# Patient Record
Sex: FEMALE | Race: WHITE | NOT HISPANIC OR LATINO | Employment: FULL TIME | URBAN - METROPOLITAN AREA
[De-identification: names, ages, dates, MRNs, and addresses within clinical notes are randomized per-mention and may not be internally consistent; named-entity substitution may affect disease eponyms.]

---

## 2017-05-10 ENCOUNTER — HOSPITAL ENCOUNTER (OUTPATIENT)
Dept: RADIOLOGY | Facility: MEDICAL CENTER | Age: 32
End: 2017-05-10
Attending: FAMILY MEDICINE
Payer: COMMERCIAL

## 2017-05-10 ENCOUNTER — HOSPITAL ENCOUNTER (OUTPATIENT)
Dept: LAB | Facility: MEDICAL CENTER | Age: 32
End: 2017-05-10
Attending: FAMILY MEDICINE
Payer: COMMERCIAL

## 2017-05-10 DIAGNOSIS — M25.562 LEFT KNEE PAIN, UNSPECIFIED CHRONICITY: ICD-10-CM

## 2017-05-10 LAB
ALBUMIN SERPL BCP-MCNC: 4.7 G/DL (ref 3.2–4.9)
ALBUMIN/GLOB SERPL: 1.6 G/DL
ALP SERPL-CCNC: 66 U/L (ref 30–99)
ALT SERPL-CCNC: 26 U/L (ref 2–50)
ANION GAP SERPL CALC-SCNC: 10 MMOL/L (ref 0–11.9)
AST SERPL-CCNC: 22 U/L (ref 12–45)
BASOPHILS # BLD AUTO: 0.4 % (ref 0–1.8)
BASOPHILS # BLD: 0.04 K/UL (ref 0–0.12)
BILIRUB SERPL-MCNC: 0.5 MG/DL (ref 0.1–1.5)
BUN SERPL-MCNC: 7 MG/DL (ref 8–22)
CALCIUM SERPL-MCNC: 10.5 MG/DL (ref 8.5–10.5)
CHLORIDE SERPL-SCNC: 107 MMOL/L (ref 96–112)
CO2 SERPL-SCNC: 22 MMOL/L (ref 20–33)
CREAT SERPL-MCNC: 0.82 MG/DL (ref 0.5–1.4)
EOSINOPHIL # BLD AUTO: 0.1 K/UL (ref 0–0.51)
EOSINOPHIL NFR BLD: 1.1 % (ref 0–6.9)
ERYTHROCYTE [DISTWIDTH] IN BLOOD BY AUTOMATED COUNT: 42.6 FL (ref 35.9–50)
GFR SERPL CREATININE-BSD FRML MDRD: >60 ML/MIN/1.73 M 2
GLOBULIN SER CALC-MCNC: 3 G/DL (ref 1.9–3.5)
GLUCOSE SERPL-MCNC: 92 MG/DL (ref 65–99)
HCT VFR BLD AUTO: 51 % (ref 37–47)
HGB BLD-MCNC: 16.9 G/DL (ref 12–16)
IMM GRANULOCYTES # BLD AUTO: 0.04 K/UL (ref 0–0.11)
IMM GRANULOCYTES NFR BLD AUTO: 0.4 % (ref 0–0.9)
LYMPHOCYTES # BLD AUTO: 2.23 K/UL (ref 1–4.8)
LYMPHOCYTES NFR BLD: 24.8 % (ref 22–41)
MCH RBC QN AUTO: 31.1 PG (ref 27–33)
MCHC RBC AUTO-ENTMCNC: 33.1 G/DL (ref 33.6–35)
MCV RBC AUTO: 93.8 FL (ref 81.4–97.8)
MONOCYTES # BLD AUTO: 0.68 K/UL (ref 0–0.85)
MONOCYTES NFR BLD AUTO: 7.5 % (ref 0–13.4)
NEUTROPHILS # BLD AUTO: 5.92 K/UL (ref 2–7.15)
NEUTROPHILS NFR BLD: 65.8 % (ref 44–72)
NRBC # BLD AUTO: 0 K/UL
NRBC BLD AUTO-RTO: 0 /100 WBC
PLATELET # BLD AUTO: 369 K/UL (ref 164–446)
PMV BLD AUTO: 10.3 FL (ref 9–12.9)
POTASSIUM SERPL-SCNC: 4.2 MMOL/L (ref 3.6–5.5)
PROT SERPL-MCNC: 7.7 G/DL (ref 6–8.2)
RBC # BLD AUTO: 5.44 M/UL (ref 4.2–5.4)
SODIUM SERPL-SCNC: 139 MMOL/L (ref 135–145)
T4 FREE SERPL-MCNC: 0.81 NG/DL (ref 0.53–1.43)
TSH SERPL DL<=0.005 MIU/L-ACNC: 1.71 UIU/ML (ref 0.3–3.7)
WBC # BLD AUTO: 9 K/UL (ref 4.8–10.8)

## 2017-05-10 PROCEDURE — 80053 COMPREHEN METABOLIC PANEL: CPT

## 2017-05-10 PROCEDURE — 36415 COLL VENOUS BLD VENIPUNCTURE: CPT

## 2017-05-10 PROCEDURE — 84443 ASSAY THYROID STIM HORMONE: CPT

## 2017-05-10 PROCEDURE — 85025 COMPLETE CBC W/AUTO DIFF WBC: CPT

## 2017-05-10 PROCEDURE — 73562 X-RAY EXAM OF KNEE 3: CPT | Mod: LT

## 2017-05-10 PROCEDURE — 84439 ASSAY OF FREE THYROXINE: CPT

## 2017-09-25 ENCOUNTER — HOSPITAL ENCOUNTER (OUTPATIENT)
Dept: LAB | Facility: MEDICAL CENTER | Age: 32
End: 2017-09-25
Attending: OBSTETRICS & GYNECOLOGY
Payer: COMMERCIAL

## 2017-09-25 PROCEDURE — 88175 CYTOPATH C/V AUTO FLUID REDO: CPT

## 2017-09-26 LAB — CYTOLOGY REG CYTOL: NORMAL

## 2018-05-16 ENCOUNTER — OFFICE VISIT (OUTPATIENT)
Dept: URGENT CARE | Facility: CLINIC | Age: 33
End: 2018-05-16
Payer: COMMERCIAL

## 2018-05-16 VITALS
OXYGEN SATURATION: 98 % | DIASTOLIC BLOOD PRESSURE: 84 MMHG | BODY MASS INDEX: 36.96 KG/M2 | RESPIRATION RATE: 16 BRPM | HEIGHT: 66 IN | TEMPERATURE: 98.4 F | WEIGHT: 230 LBS | SYSTOLIC BLOOD PRESSURE: 128 MMHG | HEART RATE: 73 BPM

## 2018-05-16 DIAGNOSIS — H65.192 OTHER ACUTE NONSUPPURATIVE OTITIS MEDIA OF LEFT EAR, RECURRENCE NOT SPECIFIED: ICD-10-CM

## 2018-05-16 PROCEDURE — 99214 OFFICE O/P EST MOD 30 MIN: CPT | Performed by: NURSE PRACTITIONER

## 2018-05-16 RX ORDER — AZITHROMYCIN 250 MG/1
TABLET, FILM COATED ORAL
Qty: 6 TAB | Refills: 0 | Status: SHIPPED | OUTPATIENT
Start: 2018-05-16 | End: 2020-08-27 | Stop reason: CLARIF

## 2018-05-16 ASSESSMENT — ENCOUNTER SYMPTOMS
SORE THROAT: 0
FEVER: 0

## 2018-05-16 NOTE — LETTER
May 16, 2018         Patient: Anaya Cortes   YOB: 1985   Date of Visit: 5/16/2018           To Whom it May Concern:    Anaya Cortes was seen in my clinic on 5/16/2018. Please excuse her from work 5/16/18.      Sincerely,           LINDEN Sawyer.  Electronically Signed

## 2018-05-16 NOTE — PROGRESS NOTES
"Subjective:      Anaya Cortes is a 32 y.o. female who presents with Otalgia (ear pain (L) x1 day)            Otalgia    There is pain in the left ear. This is a new problem. Episode onset: pt reports she woke in the middle of the night with severe pain in her left ear. She does admit she was recently sick with a runny nose and cough, but those symptoms have resolved. She denies fever. The problem occurs constantly. The problem has been gradually worsening. The pain is severe. Pertinent negatives include no ear discharge, hearing loss or sore throat. She has tried NSAIDs for the symptoms. The treatment provided no relief. There is no history of a chronic ear infection.       Review of Systems   Constitutional: Negative for fever.   HENT: Positive for ear pain. Negative for ear discharge, hearing loss and sore throat.    All other systems reviewed and are negative.    Past Medical History:   Diagnosis Date   • ASTHMA       Past Surgical History:   Procedure Laterality Date   • TONSILLECTOMY        Social History     Social History   • Marital status: Single     Spouse name: N/A   • Number of children: N/A   • Years of education: N/A     Occupational History   • Not on file.     Social History Main Topics   • Smoking status: Never Smoker   • Smokeless tobacco: Never Used   • Alcohol use Yes      Comment: occ   • Drug use: No   • Sexual activity: Not on file     Other Topics Concern   • Not on file     Social History Narrative   • No narrative on file          Objective:     /84   Pulse 73   Temp 36.9 °C (98.4 °F)   Resp 16   Ht 1.676 m (5' 6\")   Wt 104.3 kg (230 lb)   SpO2 98%   BMI 37.12 kg/m²      Physical Exam   Constitutional: She is oriented to person, place, and time. Vital signs are normal. She appears well-developed and well-nourished.   HENT:   Head: Normocephalic and atraumatic.   Right Ear: Tympanic membrane and external ear normal.   Left Ear: External ear normal. Tympanic membrane is " erythematous and bulging.   Eyes: EOM are normal. Pupils are equal, round, and reactive to light.   Neck: Normal range of motion.   Cardiovascular: Normal rate and regular rhythm.    Pulmonary/Chest: Effort normal.   Musculoskeletal: Normal range of motion.   Lymphadenopathy:        Head (left side): Submandibular adenopathy present.     She has no cervical adenopathy.   Neurological: She is alert and oriented to person, place, and time.   Skin: Skin is warm and dry. Capillary refill takes less than 2 seconds.   Psychiatric: She has a normal mood and affect. Her speech is normal and behavior is normal. Thought content normal.   Vitals reviewed.              Assessment/Plan:     1. Other acute nonsuppurative otitis media of left ear, recurrence not specified  - azithromycin (ZITHROMAX) 250 MG Tab; Take 2 tabs PO on the first day, then one tab PO daily for 4 days  Dispense: 6 Tab; Refill: 0    Flonase twice daily for one week  Alternate tylenol and ibuprofen PRN pain  Work note provided  Supportive care, differential diagnoses, and indications for immediate follow-up discussed with patient.    Pathogenesis of diagnosis discussed including typical length and natural progression.      Instructed to return to  or nearest emergency department if symptoms fail to improve, for any change in condition, further concerns, or new concerning symptoms.  Patient states understanding of the plan of care and discharge instructions.

## 2018-10-08 ENCOUNTER — HOSPITAL ENCOUNTER (OUTPATIENT)
Dept: LAB | Facility: MEDICAL CENTER | Age: 33
End: 2018-10-08
Attending: OBSTETRICS & GYNECOLOGY
Payer: COMMERCIAL

## 2018-10-08 PROCEDURE — 87624 HPV HI-RISK TYP POOLED RSLT: CPT

## 2018-10-08 PROCEDURE — 88175 CYTOPATH C/V AUTO FLUID REDO: CPT

## 2018-10-09 LAB
CYTOLOGY REG CYTOL: NORMAL
HPV HR 12 DNA CVX QL NAA+PROBE: NEGATIVE
HPV16 DNA SPEC QL NAA+PROBE: NEGATIVE
HPV18 DNA SPEC QL NAA+PROBE: NEGATIVE
SPECIMEN SOURCE: NORMAL

## 2019-03-08 ENCOUNTER — TELEMEDICINE2 (OUTPATIENT)
Dept: URGENT CARE | Facility: CLINIC | Age: 34
End: 2019-03-08
Payer: COMMERCIAL

## 2019-03-08 DIAGNOSIS — A08.4 VIRAL GASTROENTERITIS: ICD-10-CM

## 2019-03-08 PROCEDURE — 99213 OFFICE O/P EST LOW 20 MIN: CPT | Performed by: NURSE PRACTITIONER

## 2019-03-08 RX ORDER — ONDANSETRON 4 MG/1
4 TABLET, FILM COATED ORAL EVERY 6 HOURS PRN
Qty: 10 TAB | Refills: 0 | Status: SHIPPED | OUTPATIENT
Start: 2019-03-08 | End: 2019-03-11

## 2019-03-08 NOTE — LETTER
March 8, 2019         Patient: Anaya Cortes   YOB: 1985   Date of Visit: 3/8/2019           To Whom it May Concern:    Anaya Cortes was seen in my clinic on 3/8/2019. She may return to work in 1-3 days as symptoms improve.    If you have any questions or concerns, please don't hesitate to call.        Sincerely,           LINDEN Brown.  Electronically Signed

## 2019-03-09 ASSESSMENT — ENCOUNTER SYMPTOMS
BLOOD IN STOOL: 0
ABDOMINAL PAIN: 0
CONSTIPATION: 0
FEVER: 0
WEAKNESS: 0
DIAPHORESIS: 0
DIARRHEA: 1
CHILLS: 0
HEARTBURN: 0
VOMITING: 1
NAUSEA: 1

## 2019-03-09 NOTE — PROGRESS NOTES
Subjective:      Anaya Cortes is a 33 y.o. female who presents with No chief complaint on file.            Patient presents for a virtual visit.  Identification was verified.  Patient was informed that encounter would be conducted over a secure, encrypted network and consent was obtained.  Patient comes in today with a 1 day history of nausea, vomiting, and diarrhea.  Associated factors: fatigue.  No blood, mucus, or tarry stools.  Patient has tried nothing to treat the symptoms.  Denies any fever, chills, or abdominal pain.  No suspected food poisoning.  No recent travel, camping, or drinking from streams.  No suspected pregnancy.          Review of Systems   Constitutional: Positive for malaise/fatigue. Negative for chills, diaphoresis and fever.   Gastrointestinal: Positive for diarrhea, nausea and vomiting. Negative for abdominal pain, blood in stool, constipation, heartburn and melena.   Neurological: Negative for weakness.     Medications, Allergies, and current problem list reviewed today in Epic     Objective:     There were no vitals taken for this visit.     Physical Exam   Constitutional: She is oriented to person, place, and time. She appears well-developed and well-nourished. No distress.   Patient appears fatigued but non-toxic.   Pulmonary/Chest: Effort normal.   Abdominal: Soft. She exhibits no distension. There is no tenderness.   Neurological: She is alert and oriented to person, place, and time.   Skin: She is not diaphoretic.   Vitals reviewed.              Assessment/Plan:     1. Viral gastroenteritis  - ondansetron (ZOFRAN) 4 MG Tab tablet; Take 1 Tab by mouth every 6 hours as needed for Nausea/Vomiting for up to 3 days.  Dispense: 10 Tab; Refill: 0    Advised patient that based on the history and exam findings, this is likely a self-limiting viral illness.  There is no indication for antibiotics at this time.  Differential reviewed.  Zofran as prescribed.  Advance diet as  tolerated.  Maintain adequate po hydration.  RTC in 3 days if symptoms persist, sooner if worse.  Patient verbalized understanding of and agreed with plan of care.

## 2019-04-26 ENCOUNTER — TELEMEDICINE2 (OUTPATIENT)
Dept: URGENT CARE | Facility: CLINIC | Age: 34
End: 2019-04-26
Payer: COMMERCIAL

## 2019-04-26 DIAGNOSIS — G43.809 OTHER MIGRAINE WITHOUT STATUS MIGRAINOSUS, NOT INTRACTABLE: ICD-10-CM

## 2019-04-26 PROCEDURE — 99214 OFFICE O/P EST MOD 30 MIN: CPT | Performed by: FAMILY MEDICINE

## 2019-04-26 RX ORDER — RIZATRIPTAN BENZOATE 5 MG/1
5 TABLET ORAL
Qty: 5 TAB | Refills: 0 | Status: SHIPPED | OUTPATIENT
Start: 2019-04-26 | End: 2020-08-27 | Stop reason: CLARIF

## 2019-04-26 RX ORDER — NORETHINDRONE 0.35 MG/1
TABLET ORAL
COMMUNITY
Start: 2019-03-06 | End: 2020-08-27 | Stop reason: CLARIF

## 2019-04-26 NOTE — PROGRESS NOTES
Chief Complaint   Patient presents with   • Migraine     x 11 days /  RT temple           Migraine   This is a new problem. The current episode started in the past 11 days. The problem occurs constantly. The problem has been unchanged. The pain is located in the rt temporal   region. The pain does not radiate. The pain quality is similar to prior headaches. The quality of the pain is described as sharp. Associated symptoms include dizziness, nausea, emesis,  and photophobia. Pertinent negatives include no abdominal pain or fever. The symptoms are aggravated by activity and bright light. Patient has tried excedrin, imitrex, motrin,  ice, hot bath for the symptoms. The treatment provided no relief.  past medical history is significant for migraine headaches.       Social History   Substance Use Topics   • Smoking status: Never Smoker   • Smokeless tobacco: Never Used   • Alcohol use Yes      Comment: occ           Past Medical History:   Diagnosis Date   • ASTHMA            Review of Systems   Constitutional: Negative for fever and chills.   Eyes: Positive for photophobia.   Respiratory: Negative for shortness of breath.    Cardiovascular: Negative for chest pain and palpitations.   Gastrointestinal: Positive for nausea. Negative for abdominal pain.   Skin: Negative for rash.   Neurological: Positive for dizziness and headaches.   Psychiatric/Behavioral: The patient is not nervous/anxious.    All other systems reviewed and are negative.         Objective:     There were no vitals taken for this visit.    Physical Exam   Constitutional: pt is oriented to person, place, and time.  Pt appears well-developed and well-nourished. No distress.   HENT:   Head: Normocephalic and atraumatic.    Eyes: Conjunctivae and EOM are normal.   No scleral icterus.    Neurologic: Alert and oriented.    Psychiatric:  behavior is normal.   Nursing note and vitals reviewed.              Assessment/Plan:       1. Other migraine without status  migrainosus, not intractable     - rizatriptan (MAXALT) 5 MG tablet; Take 1 Tab by mouth Once PRN for Migraine for up to 1 dose.  Dispense: 5 Tab; Refill: 0    Follow up in one week if no improvement, sooner if symptoms worsen.

## 2019-05-18 ENCOUNTER — HOSPITAL ENCOUNTER (OUTPATIENT)
Dept: LAB | Facility: MEDICAL CENTER | Age: 34
End: 2019-05-18
Attending: FAMILY MEDICINE
Payer: COMMERCIAL

## 2019-05-18 LAB
ALBUMIN SERPL BCP-MCNC: 4.5 G/DL (ref 3.2–4.9)
ALBUMIN/GLOB SERPL: 1.6 G/DL
ALP SERPL-CCNC: 50 U/L (ref 30–99)
ALT SERPL-CCNC: 17 U/L (ref 2–50)
ANION GAP SERPL CALC-SCNC: 9 MMOL/L (ref 0–11.9)
AST SERPL-CCNC: 23 U/L (ref 12–45)
BILIRUB SERPL-MCNC: 0.4 MG/DL (ref 0.1–1.5)
BUN SERPL-MCNC: 12 MG/DL (ref 8–22)
CALCIUM SERPL-MCNC: 9.6 MG/DL (ref 8.5–10.5)
CHLORIDE SERPL-SCNC: 106 MMOL/L (ref 96–112)
CHOLEST SERPL-MCNC: 151 MG/DL (ref 100–199)
CO2 SERPL-SCNC: 23 MMOL/L (ref 20–33)
CREAT SERPL-MCNC: 0.74 MG/DL (ref 0.5–1.4)
EST. AVERAGE GLUCOSE BLD GHB EST-MCNC: 108 MG/DL
GLOBULIN SER CALC-MCNC: 2.8 G/DL (ref 1.9–3.5)
GLUCOSE SERPL-MCNC: 91 MG/DL (ref 65–99)
HBA1C MFR BLD: 5.4 % (ref 0–5.6)
HDLC SERPL-MCNC: 35 MG/DL
LDLC SERPL CALC-MCNC: 86 MG/DL
POTASSIUM SERPL-SCNC: 4.3 MMOL/L (ref 3.6–5.5)
PROT SERPL-MCNC: 7.3 G/DL (ref 6–8.2)
SODIUM SERPL-SCNC: 138 MMOL/L (ref 135–145)
T4 FREE SERPL-MCNC: 0.93 NG/DL (ref 0.53–1.43)
TRIGL SERPL-MCNC: 152 MG/DL (ref 0–149)
TSH SERPL DL<=0.005 MIU/L-ACNC: 1.12 UIU/ML (ref 0.38–5.33)

## 2019-05-18 PROCEDURE — 80061 LIPID PANEL: CPT

## 2019-05-18 PROCEDURE — 84439 ASSAY OF FREE THYROXINE: CPT

## 2019-05-18 PROCEDURE — 83036 HEMOGLOBIN GLYCOSYLATED A1C: CPT

## 2019-05-18 PROCEDURE — 36415 COLL VENOUS BLD VENIPUNCTURE: CPT

## 2019-05-18 PROCEDURE — 80053 COMPREHEN METABOLIC PANEL: CPT

## 2019-05-18 PROCEDURE — 84443 ASSAY THYROID STIM HORMONE: CPT

## 2019-06-24 ENCOUNTER — OFFICE VISIT (OUTPATIENT)
Dept: CARDIOLOGY | Facility: MEDICAL CENTER | Age: 34
End: 2019-06-24
Payer: COMMERCIAL

## 2019-06-24 VITALS
BODY MASS INDEX: 41.62 KG/M2 | OXYGEN SATURATION: 94 % | HEIGHT: 66 IN | WEIGHT: 259 LBS | HEART RATE: 84 BPM | DIASTOLIC BLOOD PRESSURE: 92 MMHG | SYSTOLIC BLOOD PRESSURE: 130 MMHG

## 2019-06-24 DIAGNOSIS — R00.2 PALPITATIONS: ICD-10-CM

## 2019-06-24 PROBLEM — G43.909 MIGRAINE: Status: ACTIVE | Noted: 2019-06-24

## 2019-06-24 LAB — EKG IMPRESSION: NORMAL

## 2019-06-24 PROCEDURE — 99244 OFF/OP CNSLTJ NEW/EST MOD 40: CPT | Performed by: INTERNAL MEDICINE

## 2019-06-24 PROCEDURE — 93000 ELECTROCARDIOGRAM COMPLETE: CPT | Performed by: INTERNAL MEDICINE

## 2019-06-24 ASSESSMENT — ENCOUNTER SYMPTOMS
MUSCULOSKELETAL NEGATIVE: 1
PALPITATIONS: 1
HEADACHES: 1
INSOMNIA: 1
GASTROINTESTINAL NEGATIVE: 1

## 2019-06-24 NOTE — LETTER
"     [facility]   [address]  [place] [address]  Phone: [phone]  Fax: [phone]              Anaya Cortes  1985    Encounter Date: 6/24/2019    Ivana Vasquez M.D.          PROGRESS NOTE:  Chief Complaint   Patient presents with   • Palpitations     new patient       Subjective:   Anaya Cortes is a 33 y.o. female who presents today     Past Medical History:   Diagnosis Date   • ASTHMA      Past Surgical History:   Procedure Laterality Date   • TONSILLECTOMY       History reviewed. No pertinent family history.  Social History     Social History   • Marital status: Single     Spouse name: N/A   • Number of children: N/A   • Years of education: N/A     Occupational History   • Not on file.     Social History Main Topics   • Smoking status: Never Smoker   • Smokeless tobacco: Never Used   • Alcohol use Yes      Comment: occ   • Drug use: No   • Sexual activity: Not on file     Other Topics Concern   • Not on file     Social History Narrative   • No narrative on file     Allergies   Allergen Reactions   • Vicodin [Hydrocodone-Acetaminophen] Nausea     Outpatient Encounter Prescriptions as of 6/24/2019   Medication Sig Dispense Refill   • KYLE 0.35 MG tablet      • rizatriptan (MAXALT) 5 MG tablet Take 1 Tab by mouth Once PRN for Migraine for up to 1 dose. 5 Tab 0   • azithromycin (ZITHROMAX) 250 MG Tab Take 2 tabs PO on the first day, then one tab PO daily for 4 days (Patient not taking: Reported on 4/26/2019) 6 Tab 0   • Non Formulary Request Patient is taking BCP       No facility-administered encounter medications on file as of 6/24/2019.      Review of Systems   Cardiovascular: Positive for palpitations.   Neurological: Positive for headaches.   All other systems reviewed and are negative.       Objective:   /92 (BP Location: Left arm, Patient Position: Sitting)   Pulse 84   Ht 1.676 m (5' 6\")   Wt 117.5 kg (259 lb)   SpO2 " 94%   BMI 41.80 kg/m²      Physical Exam   Constitutional: She is oriented to person, place, and time. She appears well-developed and well-nourished. No distress.   HENT:   Head: Atraumatic.   Eyes: Right eye exhibits no discharge. Left eye exhibits no discharge.   Neck: No JVD present. No thyromegaly present.   Cardiovascular: Normal rate, regular rhythm and normal heart sounds.    Pulmonary/Chest: Effort normal and breath sounds normal.   Abdominal: Soft. She exhibits no distension.   Musculoskeletal: She exhibits no edema or deformity.   Neurological: She is alert and oriented to person, place, and time.   Skin: Skin is warm and dry.   Psychiatric: She has a normal mood and affect. Her behavior is normal.     EKG today by my review was normal.    Assessment:     1. Palpitations  EKG       Medical Decision Making:  Today's Assessment / Status / Plan:            No Recipients

## 2019-06-24 NOTE — PROGRESS NOTES
Chief Complaint   Patient presents with   • Palpitations     new patient       Subjective:   Anaya Cortes is a 33 y.o. female who presents today for evalution of above issue    The patient is being seen in consultation at the request of Dr. Michael Rodriguez for palpitation.  She stated that she has been experiencing intermittent palpitation for a while but it has become a lot more frequent lately to almost daily.  She describes them as skipped beat occasionally associated with lightheaded.  They at the time take her breath a way.  They occur randomly but sometime are more noticeable with exercise.  She has not had any syncope.    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      Past Medical History:   Diagnosis Date   • ASTHMA      Past Surgical History:   Procedure Laterality Date   • TONSILLECTOMY       History reviewed. No pertinent family history.  Social History     Social History   • Marital status: Single     Spouse name: N/A   • Number of children: N/A   • Years of education: N/A     Occupational History   • Not on file.     Social History Main Topics   • Smoking status: Never Smoker   • Smokeless tobacco: Never Used   • Alcohol use Yes      Comment: occ   • Drug use: No   • Sexual activity: Not on file     Other Topics Concern   • Not on file     Social History Narrative   • No narrative on file     Allergies   Allergen Reactions   • Vicodin [Hydrocodone-Acetaminophen] Nausea     Outpatient Encounter Prescriptions as of 2019   Medication Sig Dispense Refill   • KYLE 0.35 MG tablet      • rizatriptan (MAXALT) 5 MG tablet Take 1 Tab by mouth Once PRN for Migraine for up to 1 dose. 5 Tab 0   • azithromycin (ZITHROMAX) 250 MG Tab Take 2 tabs PO on the first day, then one tab PO daily for 4 days (Patient not taking:  "Reported on 4/26/2019) 6 Tab 0   • Non Formulary Request Patient is taking BCP       No facility-administered encounter medications on file as of 6/24/2019.      Review of Systems   Constitutional:        Has been trying to lose weight by cutting on sugar  Loss 15 lbs last few weeks   HENT: Negative.    Respiratory:        Denies snoring ir daytime sleepiness   Cardiovascular: Positive for palpitations.   Gastrointestinal: Negative.    Genitourinary: Negative.    Musculoskeletal: Negative.    Skin: Negative.    Neurological: Positive for headaches.   Endo/Heme/Allergies: Negative.    Psychiatric/Behavioral: The patient has insomnia.         Loss her fiancé 3 years ago  Wakes up several times a night   All other systems reviewed and are negative.       Objective:   /92 (BP Location: Left arm, Patient Position: Sitting)   Pulse 84   Ht 1.676 m (5' 6\")   Wt 117.5 kg (259 lb)   SpO2 94%   BMI 41.80 kg/m²     Physical Exam   Constitutional: She is oriented to person, place, and time. She appears well-developed. No distress.   Obese   HENT:   Head: Atraumatic.   Eyes: Right eye exhibits no discharge. Left eye exhibits no discharge.   Neck: No JVD present. No thyromegaly present.   Cardiovascular: Normal rate, regular rhythm and normal heart sounds.    Pulmonary/Chest: Effort normal and breath sounds normal.   Abdominal: Soft. She exhibits no distension.   Musculoskeletal: She exhibits no edema or deformity.   Neurological: She is alert and oriented to person, place, and time.   Skin: Skin is warm and dry.   Psychiatric: She has a normal mood and affect. Her behavior is normal.     EKG today by my review was normal.    CMP and thyroid function test a month ago were normal.  Lipid profile LDL 86 HDL 35.      Assessment:     1. Palpitations  EKG       Medical Decision Making:  Today's Assessment / Status / Plan:     The description of her palpitations I ssuggestive of ectopic beat, probably PVC.  She is overweight " but has no other risk factor for major cardiac issue.  Her physical examination and EKG also did not suggest structural heart disease.     We will arrange for Holter monitoring but will hold off on other major cardiac testing chest echocardiography for now.  I had a lengthy discussion with her and reassured her that it is not likely that she has any major cardiac arrhythmia.  We will keep you posted about our findings and further recommendation as they become available.  I will see her on an as-needed basis unless her Holter monitoring showed any significant arrhythmia.  Thank you for allowing us to participate in this patient.

## 2019-07-08 ENCOUNTER — NON-PROVIDER VISIT (OUTPATIENT)
Dept: CARDIOLOGY | Facility: MEDICAL CENTER | Age: 34
End: 2019-07-08
Payer: COMMERCIAL

## 2019-07-08 DIAGNOSIS — R00.2 PALPITATIONS: ICD-10-CM

## 2019-07-08 DIAGNOSIS — I49.3 PVC'S (PREMATURE VENTRICULAR CONTRACTIONS): ICD-10-CM

## 2019-07-08 PROCEDURE — 93224 XTRNL ECG REC UP TO 48 HRS: CPT | Performed by: INTERNAL MEDICINE

## 2019-07-09 ENCOUNTER — TELEPHONE (OUTPATIENT)
Dept: CARDIOLOGY | Facility: MEDICAL CENTER | Age: 34
End: 2019-07-09

## 2019-07-09 DIAGNOSIS — R00.2 PALPITATIONS: ICD-10-CM

## 2019-07-09 NOTE — TELEPHONE ENCOUNTER
need future holter order, CR ordered 24 hour holter, dx: palps   Received: Today Message Contents   Zeina Moran, Med Ass't  Elizabeth Ayon R.N.     24 hour Holter monitor ordered.

## 2019-07-10 LAB — EKG IMPRESSION: NORMAL

## 2019-07-11 ENCOUNTER — TELEPHONE (OUTPATIENT)
Dept: CARDIOLOGY | Facility: MEDICAL CENTER | Age: 34
End: 2019-07-11

## 2019-07-11 NOTE — TELEPHONE ENCOUNTER
Holter Monitor Study   Order: [id number]   Status:  Final result   Visible to patient:  No (Not Released)  Dx:  Palpitations  SHARON Jones R.N.     No sig arrhythmia      Attempted to call patient, unable to reach.  Left voicemail regarding holter monitor results being released through [employer] and to return this call if she has any questions or concerns.    [employer] Message sent to pt regarding MD recommendations.

## 2019-10-21 ENCOUNTER — HOSPITAL ENCOUNTER (OUTPATIENT)
Dept: LAB | Facility: MEDICAL CENTER | Age: 34
End: 2019-10-21
Attending: OBSTETRICS & GYNECOLOGY
Payer: COMMERCIAL

## 2019-10-21 LAB — CYTOLOGY REG CYTOL: NORMAL

## 2019-10-21 PROCEDURE — 88175 CYTOPATH C/V AUTO FLUID REDO: CPT

## 2020-07-13 ENCOUNTER — TELEPHONE (OUTPATIENT)
Dept: SCHEDULING | Facility: IMAGING CENTER | Age: 35
End: 2020-07-13

## 2020-07-13 NOTE — LETTER
[facility]  Mingo Brock M.D.  Fax: [phone]   Authorization for Release/Disclosure of   Protected Health Information   Name: ANAYA CORTES : 1985 SSN: [id number]   Address: [address] [place] [address] [place] [address] Phone:    [phone] (home)    I authorize the entity listed below to release/disclose the PHI below to:   [facility]/Michael Rodriguez D.O. and Mingo Brock M.D.   Provider or Entity Name: Michael Rodriguez D.O.      Address   City, State, Zip  [address] [place] [address] Phone:  [phone]    Fax:  [phone]   Reason for request: Continuity Of Care   Information to be released:    [  ] LAST COLONOSCOPY,  including any PATH REPORT and follow-up  [  ] LAST FIT/COLOGUARD RESULT [  ] LAST DEXA  [  ] LAST MAMMOGRAM  [  ] LAST PAP  [  ] LAST LABS [  ] RETINA EXAM REPORT  [  ] IMMUNIZATION RECORDS  [X] Release all info        DATES OF SERVICE OR TIME PERIOD TO BE DISCLOSED: _____________  I understand and acknowledge that:  * This Authorization may be revoked at any time by you in writing, except if your health information has already been used or disclosed.  * Your health information that will be used or disclosed as a result of you signing this authorization could be re-disclosed by the recipient. If this occurs, your re-disclosed health information may no longer be protected by State or Federal laws.  * You may refuse to sign this Authorization. Your refusal will not affect your ability to obtain treatment.  * This Authorization becomes effective upon signing and will  on (date) __________.      If no date is indicated, this Authorization will  one (1) year from the signature date.    Name: Anaya Cortes    Signature: Continuity Of Care   Date:     2020       PLEASE FAX REQUESTED RECORDS BACK TO: [phone]

## 2020-08-27 ENCOUNTER — OFFICE VISIT (OUTPATIENT)
Dept: MEDICAL GROUP | Facility: PHYSICIAN GROUP | Age: 35
End: 2020-08-27
Payer: COMMERCIAL

## 2020-08-27 ENCOUNTER — HOSPITAL ENCOUNTER (OUTPATIENT)
Dept: LAB | Facility: MEDICAL CENTER | Age: 35
End: 2020-08-27
Attending: INTERNAL MEDICINE
Payer: COMMERCIAL

## 2020-08-27 VITALS
WEIGHT: 229 LBS | DIASTOLIC BLOOD PRESSURE: 86 MMHG | SYSTOLIC BLOOD PRESSURE: 106 MMHG | OXYGEN SATURATION: 95 % | HEART RATE: 81 BPM | TEMPERATURE: 98.4 F | RESPIRATION RATE: 16 BRPM | HEIGHT: 66 IN | BODY MASS INDEX: 36.8 KG/M2

## 2020-08-27 DIAGNOSIS — R20.0 HAND NUMBNESS: ICD-10-CM

## 2020-08-27 DIAGNOSIS — Z23 NEED FOR VACCINATION: ICD-10-CM

## 2020-08-27 DIAGNOSIS — M54.16 LUMBAR RADICULOPATHY: ICD-10-CM

## 2020-08-27 DIAGNOSIS — G43.001 MIGRAINE WITHOUT AURA AND WITH STATUS MIGRAINOSUS, NOT INTRACTABLE: ICD-10-CM

## 2020-08-27 DIAGNOSIS — R20.0 NUMBNESS OF RIGHT FOOT: ICD-10-CM

## 2020-08-27 LAB
ALT SERPL-CCNC: 10 U/L (ref 2–50)
ANION GAP SERPL CALC-SCNC: 12 MMOL/L (ref 7–16)
BASOPHILS # BLD AUTO: 0.7 % (ref 0–1.8)
BASOPHILS # BLD: 0.05 K/UL (ref 0–0.12)
BUN SERPL-MCNC: 14 MG/DL (ref 8–22)
CALCIUM SERPL-MCNC: 9.8 MG/DL (ref 8.5–10.5)
CHLORIDE SERPL-SCNC: 106 MMOL/L (ref 96–112)
CHOLEST SERPL-MCNC: 132 MG/DL (ref 100–199)
CO2 SERPL-SCNC: 22 MMOL/L (ref 20–33)
CREAT SERPL-MCNC: 0.75 MG/DL (ref 0.5–1.4)
CREAT UR-MCNC: 178.81 MG/DL
EOSINOPHIL # BLD AUTO: 0.14 K/UL (ref 0–0.51)
EOSINOPHIL NFR BLD: 1.9 % (ref 0–6.9)
ERYTHROCYTE [DISTWIDTH] IN BLOOD BY AUTOMATED COUNT: 42.7 FL (ref 35.9–50)
EST. AVERAGE GLUCOSE BLD GHB EST-MCNC: 111 MG/DL
FASTING STATUS PATIENT QL REPORTED: NORMAL
GLUCOSE SERPL-MCNC: 94 MG/DL (ref 65–99)
HBA1C MFR BLD: 5.5 % (ref 0–5.6)
HCT VFR BLD AUTO: 49.3 % (ref 37–47)
HDLC SERPL-MCNC: 40 MG/DL
HGB BLD-MCNC: 16.4 G/DL (ref 12–16)
IMM GRANULOCYTES # BLD AUTO: 0.03 K/UL (ref 0–0.11)
IMM GRANULOCYTES NFR BLD AUTO: 0.4 % (ref 0–0.9)
LDLC SERPL CALC-MCNC: 74 MG/DL
LYMPHOCYTES # BLD AUTO: 2.22 K/UL (ref 1–4.8)
LYMPHOCYTES NFR BLD: 29.5 % (ref 22–41)
MCH RBC QN AUTO: 31.2 PG (ref 27–33)
MCHC RBC AUTO-ENTMCNC: 33.3 G/DL (ref 33.6–35)
MCV RBC AUTO: 93.7 FL (ref 81.4–97.8)
MICROALBUMIN UR-MCNC: 25.2 MG/DL
MICROALBUMIN/CREAT UR: 141 MG/G (ref 0–30)
MONOCYTES # BLD AUTO: 0.47 K/UL (ref 0–0.85)
MONOCYTES NFR BLD AUTO: 6.3 % (ref 0–13.4)
NEUTROPHILS # BLD AUTO: 4.61 K/UL (ref 2–7.15)
NEUTROPHILS NFR BLD: 61.2 % (ref 44–72)
NRBC # BLD AUTO: 0 K/UL
NRBC BLD-RTO: 0 /100 WBC
PLATELET # BLD AUTO: 307 K/UL (ref 164–446)
PMV BLD AUTO: 10.5 FL (ref 9–12.9)
POTASSIUM SERPL-SCNC: 4.3 MMOL/L (ref 3.6–5.5)
RBC # BLD AUTO: 5.26 M/UL (ref 4.2–5.4)
SODIUM SERPL-SCNC: 140 MMOL/L (ref 135–145)
TRIGL SERPL-MCNC: 88 MG/DL (ref 0–149)
WBC # BLD AUTO: 7.5 K/UL (ref 4.8–10.8)

## 2020-08-27 PROCEDURE — 99204 OFFICE O/P NEW MOD 45 MIN: CPT | Performed by: INTERNAL MEDICINE

## 2020-08-27 PROCEDURE — 84460 ALANINE AMINO (ALT) (SGPT): CPT

## 2020-08-27 PROCEDURE — 82570 ASSAY OF URINE CREATININE: CPT

## 2020-08-27 PROCEDURE — 85025 COMPLETE CBC W/AUTO DIFF WBC: CPT

## 2020-08-27 PROCEDURE — 80048 BASIC METABOLIC PNL TOTAL CA: CPT

## 2020-08-27 PROCEDURE — 84443 ASSAY THYROID STIM HORMONE: CPT

## 2020-08-27 PROCEDURE — 36415 COLL VENOUS BLD VENIPUNCTURE: CPT

## 2020-08-27 PROCEDURE — 83036 HEMOGLOBIN GLYCOSYLATED A1C: CPT

## 2020-08-27 PROCEDURE — 82043 UR ALBUMIN QUANTITATIVE: CPT

## 2020-08-27 PROCEDURE — 80061 LIPID PANEL: CPT

## 2020-08-27 PROCEDURE — 82607 VITAMIN B-12: CPT

## 2020-08-27 ASSESSMENT — PATIENT HEALTH QUESTIONNAIRE - PHQ9: CLINICAL INTERPRETATION OF PHQ2 SCORE: 0
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
## 2020-08-27 NOTE — ASSESSMENT & PLAN NOTE
This is a chronic condition.  Currently the patient is asymptomatic.  The patient stated that since she has quit drinking alcohol the migraine has resolved.  She is currently not take any medication.
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
## 2020-08-28 ENCOUNTER — TELEPHONE (OUTPATIENT)
Dept: MEDICAL GROUP | Facility: PHYSICIAN GROUP | Age: 35
End: 2020-08-28

## 2020-08-28 ENCOUNTER — PATIENT MESSAGE (OUTPATIENT)
Dept: MEDICAL GROUP | Facility: PHYSICIAN GROUP | Age: 35
End: 2020-08-28

## 2020-08-28 DIAGNOSIS — R80.9 PROTEINURIA, UNSPECIFIED TYPE: ICD-10-CM

## 2020-08-28 LAB
TSH SERPL DL<=0.005 MIU/L-ACNC: 0.98 UIU/ML (ref 0.38–5.33)
VIT B12 SERPL-MCNC: 489 PG/ML (ref 211–911)

## 2020-08-28 NOTE — TELEPHONE ENCOUNTER
1st Attempt:    Left voicemail message for patient to call the office back at [phone]    Sent pt my chart message.

## 2020-08-28 NOTE — TELEPHONE ENCOUNTER
I spoke to Anaya.    Anaya advised of MD mcfarland in full.    Anaya stated that she is NOT taking a protein supplement.

## 2020-08-28 NOTE — TELEPHONE ENCOUNTER
----- Message from Mingo Brock M.D. sent at 8/28/2020  8:14 AM PDT -----    Please notify patient :urine test showed protein >> needing further investigation    Ordered 24hr urine protein. Pls confirm if pt is taking otc protein supplement?

## 2020-08-31 ENCOUNTER — TELEPHONE (OUTPATIENT)
Dept: MEDICAL GROUP | Facility: PHYSICIAN GROUP | Age: 35
End: 2020-08-31

## 2020-08-31 ENCOUNTER — HOSPITAL ENCOUNTER (OUTPATIENT)
Facility: MEDICAL CENTER | Age: 35
End: 2020-08-31
Attending: INTERNAL MEDICINE
Payer: COMMERCIAL

## 2020-08-31 DIAGNOSIS — R80.9 PROTEINURIA, UNSPECIFIED TYPE: ICD-10-CM

## 2020-08-31 LAB
PROT 24H UR-MCNC: 136 MG/24 HR (ref 30–150)
PROT 24H UR-MRATE: 16 MG/DL (ref 0–15)
SPECIMEN VOL UR: 850 ML

## 2020-08-31 PROCEDURE — 81050 URINALYSIS VOLUME MEASURE: CPT

## 2020-08-31 PROCEDURE — 84156 ASSAY OF PROTEIN URINE: CPT

## 2020-08-31 NOTE — TELEPHONE ENCOUNTER
----- Message from Mingo Brock M.D. sent at 8/31/2020  3:28 PM PDT -----    Please notify patient :good news the recent 24 hr urine protein test: within acceptable level

## 2020-09-08 ENCOUNTER — HOSPITAL ENCOUNTER (OUTPATIENT)
Dept: RADIOLOGY | Facility: MEDICAL CENTER | Age: 35
End: 2020-09-08
Attending: INTERNAL MEDICINE
Payer: COMMERCIAL

## 2020-09-08 ENCOUNTER — TELEPHONE (OUTPATIENT)
Dept: MEDICAL GROUP | Facility: PHYSICIAN GROUP | Age: 35
End: 2020-09-08

## 2020-09-08 DIAGNOSIS — R20.0 NUMBNESS OF RIGHT FOOT: ICD-10-CM

## 2020-09-08 DIAGNOSIS — M54.16 LUMBAR RADICULOPATHY: ICD-10-CM

## 2020-09-08 DIAGNOSIS — R20.0 HAND NUMBNESS: ICD-10-CM

## 2020-09-08 PROCEDURE — 72141 MRI NECK SPINE W/O DYE: CPT

## 2020-09-08 PROCEDURE — 72148 MRI LUMBAR SPINE W/O DYE: CPT

## 2020-09-08 NOTE — TELEPHONE ENCOUNTER
----- Message from iMngo Brock M.D. sent at 9/8/2020 10:15 AM PDT -----    Please notify patient : recent MRI ls spine came back abnormal showed    Disk herniation at L5-S1 level associated with nerve root impingement  A referral has been submitted to Physiatry specialist  for further evaluation /treatment.

## 2020-09-22 ENCOUNTER — NON-PROVIDER VISIT (OUTPATIENT)
Dept: NEUROLOGY | Facility: MEDICAL CENTER | Age: 35
End: 2020-09-22
Payer: COMMERCIAL

## 2020-09-22 DIAGNOSIS — R20.0 NUMBNESS OF RIGHT FOOT: ICD-10-CM

## 2020-09-22 DIAGNOSIS — R20.0 HAND NUMBNESS: ICD-10-CM

## 2020-09-22 DIAGNOSIS — M54.16 LUMBAR RADICULOPATHY: ICD-10-CM

## 2020-09-22 PROCEDURE — 95886 MUSC TEST DONE W/N TEST COMP: CPT | Performed by: SPECIALIST

## 2020-09-22 PROCEDURE — 95910 NRV CNDJ TEST 7-8 STUDIES: CPT | Performed by: SPECIALIST

## 2020-09-22 NOTE — PROCEDURES
NERVE CONDUCTION STUDIES AND ELECTROMYOGRAPHY REPORT        09/22/20      Referring provider: Mingo Brock M.D.      SUMMARY OF PATIENT'S CLINICAL HISTORY,PHYSICAL EXAM, AND RATIONALE FOR TESTING:    Ms. Anaya Cortes 34 y.o. presenting with hand numbness and right foot numbness.    Past Medical History is significant for :   Past Medical History:   Diagnosis Date   • ASTHMA    • Migraine            The electrodiagnostic studies were performed to evaluate for possible carpal tunnel syndrome versus radiculopathy.    ELECTRODIAGNOSTIC EXAMINATION:  Nerve conduction studies (NCS) and electromyography (EMG) are utilized to evaluate direct or indirect damage to the peripheral nervous system. NCS are performed to measure the nerve(s) response(s) to electrostimulation across a given nerve segment. EMG evaluates the passive and active electrical activity of the muscle(s) in question.  Muscles are innervated by specific peripheral nerves and roots. Often times, several nerves the muscle to be examined in order to determine the presence or absence of the disease process. Furthermore, nerves and muscles may need to be tested in a [id number] comparison, as well as in additional extremities, as this may be crucial in characterizing the extent of the disease process, which may be diffuse or isolated and of varying degree of severity. The extent of the neurodiagnostic exam is justified as it may help arrive to a proper diagnosis, which ultimately may contribute to better management of the patient. Therefore, the nerves to muscles examined during the study were medically necessary.    Unless otherwise noted, temperature of the extremity(s) study was monitored before and during the examination and remained between 32 and 36 degrees C for the upper extremities, and between 30 and 36 degrees C for the lower extremities.      NERVE CONDUCTION STUDIES:  Sensory nerves:   -Right median sensory nerve was examined.The response  was within normal limits.  -Right ulnar sensory nerve was examined. The response was within normal limits.  -Right sural nerve was tested. The response was within normal limits.    Motor nerves:  -Right median motor nerve was examined. Recording electrodes placed at the Abductor Pollicis Brevis muscles. The response was within normal limits.  -Right ulnar motor nerve was examined. Recording electrodes placed at the Abductor Digiti Minimi muscles. The response was within normal limits.  -Right tibial nerve was examined. Recording electrodes placed at the Abductor  Hallucis muscles. The response was within normal limits.  -Right deep Peroneal motor nerve was examined. Recording electrodes were placed at the Extensor Digitorum Brevis muscles.The response was within normal limits.     No temporal dispersion or conduction block observed in any of the motor nerves examined.    LATE RESPONSES:  F waves were obtained and the following nerves: Right median and right tibial.  The responses within normal limits for the patient's age.          ELECTROMYOGRAPHY:  The study was performed the concentric needle electrode. Fibrillation and fasciculation activity is graded by convention from none (0) to continuous (4+).  Needle electrode examination was performed in the following muscles: Right deltoid, biceps, triceps, first dorsal interosseous, abductor pollicis brevis, vastus lateralis, tibialis anterior, gastrocnemius, extensor digitorum brevis, abductor hallucis.  The muscles tested demonstrated normal insertional activity, normal motor unit morphology and recruitment. There were no elements suggestive of active denervation.      Nerve Conduction Studies     Stim Site NR Onset (ms) Norm Onset (ms) O-P Amp (µV) Norm O-P Amp Site1 Site2 Delta-P (ms) Dist (cm) Jason (m/s) Norm Jason (m/s)   Right Median Anti Sensory (2nd Digit)   Wrist    2.5 <3.4 41.2 >20 Wrist 2nd Digit 3.4 14.0 *41 >50   Right Sural Anti Sensory (Lat Mall)   Calf     2.6 <4.5 10.2 >5 Calf Lat Mall 3.4 14.0 41 >40   Right Ulnar Anti Sensory (5th Digit)   Wrist    2.3 <3.1 72.8 >12 Wrist 5th Digit 3.1 14.0 *45 >50        Stim Site NR Onset (ms) Norm Onset (ms) O-P Amp (mV) Norm O-P Amp Site1 Site2 Delta-0 (ms) Dist (cm) Jason (m/s) Norm Jason (m/s)   Right Median Motor (Abd Poll Brev)   Wrist    3.0 <3.9 11.8 >6 Elbow Wrist 4.3 25.0 58 >50   Elbow    7.3  11.8          Right Peroneal EDB Motor (Ext Dig Brev)   Ankle    4.9 <5.5 5.2 >3.0 B Fib Ankle 6.6 37.0 56 >40   B Fib    11.5  5.2  Poplt B Fib 1.4 10.0 71    Poplt    12.9  5.2          Right Tibial Motor (Abd King Brev)   Ankle    3.4 <6 14.0 >8 Knee Ankle 8.1 39.0 48 >40   Knee    11.5  11.2          Right Ulnar Motor (Abd Dig Min)   Wrist    2.9 <3.1 12.1 >7 B Elbow Wrist 3.0 18.0 60 >50   B Elbow    5.9  11.8  A Elbow B Elbow 1.1 10.0 91    A Elbow    7.0  11.5            F Wave Studies     NR F-Lat (ms) Lat Norm (ms)   Right Median (Abd Poll Brev)      24.77 <31   Right Tibial (Abd Hallucis)      45.59 <57   Right Ulnar (Abd Dig Min)      20.00 <32                               Electromyography     Side Muscle Nerve Root Ins Act Fibs Psw Amp Dur Poly Recrt Int Pat Comment   Right Deltoid Axillary C5-6 Nml Nml Nml Nml Nml 0 Nml Nml    Right Biceps Musculocut C5-6 Nml Nml Nml Nml Nml 0 Nml Nml    Right Triceps Radial C6-7-8 Nml Nml Nml Nml Nml 0 Nml Nml    Right 1stDorInt Ulnar C8-T1 Nml Nml Nml Nml Nml 0 Nml Nml    Right Abd Poll Brev Median C8-T1 Nml Nml Nml Nml Nml 0 Nml Nml    Right VastusLat Femoral L2-4 Nml Nml Nml Nml Nml 0 Nml Nml    Right AntTibialis Dp Br Fibular L4-5 Nml Nml Nml Nml Nml 0 Nml Nml    Right Gastroc Tibial S1-2 Nml Nml Nml Nml Nml 0 Nml Nml    Right Ext Dig Brev Dp Br Fibular L5, S1 Nml Nml Nml Nml Nml 0 Nml Nml    Right AbdHallucis MedPlantar S1-2 Nml Nml Nml Nml Nml 0 Nml Nml            DIAGNOSTIC INTERPRETATION:   Extensive electrodiagnostic studies were performed to the right upper and right lower  extremities.  The results are as follows:    1.  Normal EMG and nerve conduction study right upper extremity.    2.  Normal EMG and nerve conduction study right lower extremity.          HENOK Turpin M.D.

## 2020-10-23 ENCOUNTER — HOSPITAL ENCOUNTER (OUTPATIENT)
Dept: LAB | Facility: MEDICAL CENTER | Age: 35
End: 2020-10-23
Attending: OBSTETRICS & GYNECOLOGY
Payer: COMMERCIAL

## 2020-10-23 PROCEDURE — 88175 CYTOPATH C/V AUTO FLUID REDO: CPT

## 2020-11-03 ENCOUNTER — OFFICE VISIT (OUTPATIENT)
Dept: PHYSICAL MEDICINE AND REHAB | Facility: MEDICAL CENTER | Age: 35
End: 2020-11-03
Payer: COMMERCIAL

## 2020-11-03 VITALS
HEIGHT: 66 IN | OXYGEN SATURATION: 94 % | WEIGHT: 233.69 LBS | TEMPERATURE: 97.3 F | HEART RATE: 78 BPM | SYSTOLIC BLOOD PRESSURE: 118 MMHG | DIASTOLIC BLOOD PRESSURE: 82 MMHG | BODY MASS INDEX: 37.56 KG/M2

## 2020-11-03 DIAGNOSIS — M48.061 NEURAL FORAMINAL STENOSIS OF LUMBAR SPINE: ICD-10-CM

## 2020-11-03 DIAGNOSIS — M48.061 SPINAL STENOSIS OF LUMBAR REGION, UNSPECIFIED WHETHER NEUROGENIC CLAUDICATION PRESENT: ICD-10-CM

## 2020-11-03 DIAGNOSIS — M51.26 LUMBAR DISC HERNIATION: ICD-10-CM

## 2020-11-03 LAB — CYTOLOGY REG CYTOL: NORMAL

## 2020-11-03 PROCEDURE — 99204 OFFICE O/P NEW MOD 45 MIN: CPT | Performed by: PHYSICAL MEDICINE & REHABILITATION

## 2020-11-03 RX ORDER — IBUPROFEN 200 MG
400 TABLET ORAL
COMMUNITY
End: 2021-08-18

## 2020-11-03 ASSESSMENT — FIBROSIS 4 INDEX: FIB4 SCORE: 0.76

## 2020-11-03 ASSESSMENT — PATIENT HEALTH QUESTIONNAIRE - PHQ9: CLINICAL INTERPRETATION OF PHQ2 SCORE: 0

## 2020-11-03 ASSESSMENT — PAIN SCALES - GENERAL: PAINLEVEL: 10=SEVERE PAIN
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
## 2020-12-03 ENCOUNTER — PHYSICAL THERAPY (OUTPATIENT)
Dept: PHYSICAL THERAPY | Facility: REHABILITATION | Age: 35
End: 2020-12-03
Attending: PHYSICAL MEDICINE & REHABILITATION
Payer: COMMERCIAL

## 2020-12-03 DIAGNOSIS — M51.26 LUMBAR DISC HERNIATION: ICD-10-CM

## 2020-12-03 DIAGNOSIS — M48.061 SPINAL STENOSIS OF LUMBAR REGION, UNSPECIFIED WHETHER NEUROGENIC CLAUDICATION PRESENT: ICD-10-CM

## 2020-12-03 PROCEDURE — 97110 THERAPEUTIC EXERCISES: CPT

## 2020-12-03 PROCEDURE — 97162 PT EVAL MOD COMPLEX 30 MIN: CPT

## 2020-12-03 ASSESSMENT — ENCOUNTER SYMPTOMS
PAIN SCALE AT LOWEST: 0
PAIN SCALE AT HIGHEST: 10
PAIN TIMING: INTERMITTENT
PAIN SCALE: 3
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
## 2020-12-07 ENCOUNTER — PHYSICAL THERAPY (OUTPATIENT)
Dept: PHYSICAL THERAPY | Facility: REHABILITATION | Age: 35
End: 2020-12-07
Attending: PHYSICAL MEDICINE & REHABILITATION
Payer: COMMERCIAL

## 2020-12-07 DIAGNOSIS — M51.26 LUMBAR DISC HERNIATION: ICD-10-CM

## 2020-12-07 DIAGNOSIS — M48.061 SPINAL STENOSIS OF LUMBAR REGION, UNSPECIFIED WHETHER NEUROGENIC CLAUDICATION PRESENT: ICD-10-CM

## 2020-12-07 PROCEDURE — 97014 ELECTRIC STIMULATION THERAPY: CPT

## 2020-12-07 PROCEDURE — 97110 THERAPEUTIC EXERCISES: CPT

## 2020-12-07 NOTE — OP THERAPY DAILY TREATMENT
"  Outpatient Physical Therapy  DAILY TREATMENT     [facility] Physical [address].  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  I do think the exercises are helpful. When I had pain I would do the exercises and it would take some of the pain away. I could hold the bridge for 45 sec at home.     OBJECTIVE:  Current objective measures:           Therapeutic Exercises (CPT 10085):     1. Prone to elbows with LE's straight, 10x, decrease from 3/10 LBP to 2/10 LBP; pain during better after    2. Pt education, re: centralization/peripheralization, reviewed    3. Pt education, avoid prolonged sitting, molly before bed , reviewed     4. Supine bridge on swissball, x2; 30 sec ea, reviewed     5. Posture education, x1 min, reviewed     6. Pt education, TrA activation and importance of strengthening    8. Toy soldier, 30 sec x 2    9. HS active stretch 90/90, x10, hep    10. Ergonomics, re: edu for set up in office, HO provided     17. UPOC: 1/15/21      Therapeutic Exercise Summary: Pt performed these exercises with instruction and SPV.  Provided handout with these exercises for daily HEP.      Therapeutic Treatments and Modalities:     1. E Stim Unattended (CPT 04836), IFC to LB with mhp x 15 min    Time-based treatments/modalities:    Physical Therapy Timed Treatment Charges  Therapeutic exercise minutes (CPT 67702): 25 minutes      Pain rating (1-10) before treatment:  0/10 LBP   Pain rating (1-10) after treatment:  3/10 LB \"soreness\"     ASSESSMENT:   Response to treatment:   Good carryover of hep with pt reporting good ability to self manage " symptoms using exercises at home. Time spent reviewing education including periph/central and introduced ergonomics education with HO provided (pt currently working desk job). Able to complete all ex today with only minor increase soreness, no periph into LE.       PLAN/RECOMMENDATIONS:   Plan for treatment: therapy treatment to continue next visit.  Planned interventions for next visit: continue with current treatment.  Review ergonomics HO with pt, progress core and post chain strengthening as tolerated.

## 2020-12-09 ENCOUNTER — PHYSICAL THERAPY (OUTPATIENT)
Dept: PHYSICAL THERAPY | Facility: REHABILITATION | Age: 35
End: 2020-12-09
Attending: PHYSICAL MEDICINE & REHABILITATION
Payer: COMMERCIAL

## 2020-12-09 DIAGNOSIS — M48.061 SPINAL STENOSIS OF LUMBAR REGION, UNSPECIFIED WHETHER NEUROGENIC CLAUDICATION PRESENT: ICD-10-CM

## 2020-12-09 DIAGNOSIS — M51.26 LUMBAR DISC HERNIATION: ICD-10-CM

## 2020-12-09 PROCEDURE — 97110 THERAPEUTIC EXERCISES: CPT

## 2020-12-09 NOTE — OP THERAPY DAILY TREATMENT
Outpatient Physical Therapy  DAILY TREATMENT     [facility] Physical [address].  [address]  [place] [address]  Phone:  [phone]  Fax:  [phone]    Date: 12/09/2020    Patient: Anaya Cortes  YOB: 1985  MRN: [id number]     Time Calculation    Start time: 0730  Stop time: 0757 Time Calculation (min): 27 minutes         Chief Complaint: Back Problem    Visit #: 3    SUBJECTIVE:  I had a bad day yesterday but I feel like I'm in the upward trend. I had pain yesterday but it wasn't as bad as usual. I was able to decrease it.       OBJECTIVE:  Current objective measures:       Poor postural awareness    Therapeutic Exercises (CPT 75480):     8. Toy soldier, 30 sec x 2; orange TB , reviewed     9. HS active stretch 90/90, x10, reviewed     10. Ergonomics, re: edu for set up in office, reviewed     11. Rows, x15 with level II TB, 5 sec hold    12. Pull downs, x15 with level II TB, 5 sec hold    13. Seated HS stretch, 2x30 sec, hep; for work    14. Sciatic nerve glide, x10, hep; for work     17. UPOC: 1/15/21      Therapeutic Exercise Summary: Pt performed these exercises with instruction and SPV.  Provided handout with these exercises for daily HEP.        Time-based treatments/modalities:    Physical Therapy Timed Treatment Charges  Therapeutic exercise minutes (CPT 19957): 27 minutes      Pain rating (1-10) before treatment:  2/10 LBP   Pain rating (1-10) after treatment:  2/10     ASSESSMENT:   Response to treatment:   Good carryover of hep with pt continuing to report good ability to self manage symptoms using exercises at home (Anabell program). Introduced stretching and glides to perform at work and banded ex in closed chain without aggravation of s/s. Pt performing well overall. May benefit from increased resistance band at next session. Pt wishing to skip modalities today.      PLAN/RECOMMENDATIONS:   Plan for treatment: therapy treatment to continue next  visit.  Planned interventions for next visit: continue with current treatment.  Review ergonomics HO with pt, progress core and post chain strengthening as tolerated. Increase TB resistance.

## 2020-12-14 ENCOUNTER — PHYSICAL THERAPY (OUTPATIENT)
Dept: PHYSICAL THERAPY | Facility: REHABILITATION | Age: 35
End: 2020-12-14
Attending: PHYSICAL MEDICINE & REHABILITATION
Payer: COMMERCIAL

## 2020-12-14 DIAGNOSIS — M51.26 LUMBAR DISC HERNIATION: ICD-10-CM

## 2020-12-14 DIAGNOSIS — M48.061 SPINAL STENOSIS OF LUMBAR REGION, UNSPECIFIED WHETHER NEUROGENIC CLAUDICATION PRESENT: ICD-10-CM

## 2020-12-14 PROCEDURE — 97012 MECHANICAL TRACTION THERAPY: CPT

## 2020-12-14 PROCEDURE — 97110 THERAPEUTIC EXERCISES: CPT

## 2020-12-14 NOTE — OP THERAPY DAILY TREATMENT
Outpatient Physical Therapy  DAILY TREATMENT     [facility] Physical [address].  [address]  [place] [address]  Phone:  [phone]  Fax:  [phone]    Date: 12/14/2020    Patient: Anaya Cortes  YOB: 1985  MRN: [id number]     Time Calculation    Start time: 0731  Stop time: 0818 Time Calculation (min): 47 minutes         Chief Complaint: Back Problem    Visit #: 4    SUBJECTIVE:  I don't feel like I have painful days as often as I did. Sitting is still the hardest.       OBJECTIVE:  Current objective measures:       Poor postural awareness  Manual l/s traction: 12 sec hold; relief during pull, no adverse reaction with return to baseline    Therapeutic Exercises (CPT 85368):     8. Toy soldier, 30 sec x 2; orange TB , corrected set up; pt performing with hip hike    9. HS active stretch 90/90, x10, reviewed     10. Ergonomics, re: edu for set up in office, reviewed     11. Rows, x15 with level green TB, 5 sec hold, progressed resistance    12. Pull downs, x15 with level green TB, 5 sec hold, progressed resistance; corrected set up    13. Seated HS stretch, 2x30 sec, reviewed     14. Sciatic nerve glide, x10, reviewed; corrected set up    15. Supermans, 3x for endurance, 17 sec max endurance    17. UPOC: 1/15/21      Therapeutic Exercise Summary: Pt performed these exercises with instruction and SPV.  Provided handout with these exercises for daily HEP.      Therapeutic Treatments and Modalities:     1. Mechanical Traction (CPT 49766), mechanical lumbar traction 100/50# with mhp x 15 min     Time-based treatments/modalities:    Physical Therapy Timed Treatment Charges  Therapeutic exercise minutes (CPT 54784): 32 minutes    Pain rating (1-10) before treatment:  0/10 LBP; pressure only  Pain rating (1-10) after treatment:  0/10       ASSESSMENT:   Response to treatment:   Pt reporting positive impact on ADL's with biggest remaining complaint of difficulty with  prolonged sitting; Unsure of current tolerance but will monitor and report back at next session. Pt able to increase TB resistance today with no adverse effects. No periph of s/s in session. Trial of manual l/s traction with good outcome, therefore, trial of mech l/s traction with no change following; may benefit from increase in parameters.  Continue with posterior and core strengthening/endurance; add flexion based strengthening.     PLAN/RECOMMENDATIONS:   Plan for treatment: therapy treatment to continue next visit.  Planned interventions for next visit: continue with current treatment.  Shuttle, squats, HS ball rolls. Assess response to mech traction and increase if no adverse effects

## 2020-12-16 ENCOUNTER — PHYSICAL THERAPY (OUTPATIENT)
Dept: PHYSICAL THERAPY | Facility: REHABILITATION | Age: 35
End: 2020-12-16
Attending: PHYSICAL MEDICINE & REHABILITATION
Payer: COMMERCIAL

## 2020-12-16 DIAGNOSIS — M48.061 SPINAL STENOSIS OF LUMBAR REGION, UNSPECIFIED WHETHER NEUROGENIC CLAUDICATION PRESENT: ICD-10-CM

## 2020-12-16 DIAGNOSIS — M51.26 LUMBAR DISC HERNIATION: ICD-10-CM

## 2020-12-16 PROCEDURE — 97110 THERAPEUTIC EXERCISES: CPT

## 2020-12-16 PROCEDURE — 97012 MECHANICAL TRACTION THERAPY: CPT

## 2020-12-16 NOTE — OP THERAPY DAILY TREATMENT
Outpatient Physical Therapy  DAILY TREATMENT     [address]  [address]  [place] [address]  Phone:  [phone]  Fax:  [phone]    Date: 12/16/2020    Patient: Anaya Cortes  YOB: 1985  MRN: [id number]     Time Calculation    Start time: 0731  Stop time: 0812 Time Calculation (min): 41 minutes         Chief Complaint: Back Problem    Visit #: 5    SUBJECTIVE:  I don't have any back pain at all. No pressure either. I didn't feel a difference with the mechanical traction but I would be willing to try it again. I think I could sit for abut 25 min which is more than I could do before. I think this might be the best thing I've ever done for myself.       OBJECTIVE:  Current objective measures:           Therapeutic Exercises (CPT 71629):     15. Verbal review of hep    16. Shuttle, 8 c DL 1x15; SL 1x15 7 c ea    17. Squats+TrA brace, x15    18. HS rolls+TrA brace, x2 min with bp FB     19. Hip hinge, x15, difficulty with lumbopelvic dissociation; improved with VC's and visual FB from mirror      Therapeutic Exercise Summary: Pt performed these exercises with instruction and SPV.  Provided handout with these exercises for daily HEP.    *Provided with blue and pink TB for advancements in independent hep        Therapeutic Treatments and Modalities:     1. Mechanical Traction (CPT 65803), mechanical lumbar traction 115/55# with mhp x 15 min     Time-based treatments/modalities:    Physical Therapy Timed Treatment Charges  Therapeutic exercise minutes (CPT 92846): 26 minutes    Pain rating (1-10) before treatment:  0/10 LBP  Pain rating (1-10) after treatment:  0/10       ASSESSMENT:   Response to treatment:   Pt reporting positive impact on ADL's with good progression of hep. Difficulty with pelvic dissociation but improvement by end of session; updated hep.    PLAN/RECOMMENDATIONS:   Plan for treatment: therapy treatment to continue next  visit.  Planned interventions for next visit: continue with current treatment.  Anticipate DC next 1-2 sessions.

## 2020-12-21 ENCOUNTER — PHYSICAL THERAPY (OUTPATIENT)
Dept: PHYSICAL THERAPY | Facility: REHABILITATION | Age: 35
End: 2020-12-21
Attending: PHYSICAL MEDICINE & REHABILITATION
Payer: COMMERCIAL

## 2020-12-21 DIAGNOSIS — M51.26 LUMBAR DISC HERNIATION: ICD-10-CM

## 2020-12-21 DIAGNOSIS — M48.061 SPINAL STENOSIS OF LUMBAR REGION, UNSPECIFIED WHETHER NEUROGENIC CLAUDICATION PRESENT: ICD-10-CM

## 2020-12-21 PROCEDURE — 97110 THERAPEUTIC EXERCISES: CPT

## 2020-12-21 PROCEDURE — 97012 MECHANICAL TRACTION THERAPY: CPT

## 2020-12-21 NOTE — OP THERAPY DAILY TREATMENT
Outpatient Physical Therapy  DAILY TREATMENT     [facility] [address]  [address]  [place] [address]  Phone:  [phone]  Fax:  [phone]    Date: 12/21/2020    Patient: Anaya Cortes  YOB: 1985  MRN: [id number]     Time Calculation    Start time: 0730              Chief Complaint: Back Problem    Visit #: 6    SUBJECTIVE:  I can sit longer I was able to sit for 45 min at work because being uncomfortable.     OBJECTIVE:  Current objective measures:       See progress note for details    Therapeutic Exercises (CPT 84750):     17. Squats+TrA brace >with 10# dumbbells, x15, reviewed     18. Ball bridge, x1 for endurance, 49 sec     19. Hip hinge>5-10# dumbbells, x15, reviewed; improved lumbopelvic stability; able to progress with weights, slight incr sorreness LB, no pain      Therapeutic Exercise Summary: Pt performed these exercises with instruction and SPV.  Provided handout with these exercises for daily HEP.    *Provided with blue and pink TB for advancements in independent hep        Therapeutic Treatments and Modalities:     1. Mechanical Traction (CPT 76594), mechanical lumbar traction 115/55# with mhp x 15 min     Time-based treatments/modalities:         Pain rating (1-10) before treatment:  0/10 LBP  Pain rating (1-10) after treatment:  0/10       ASSESSMENT:   Response to treatment:   See DC note.    PLAN/RECOMMENDATIONS:   Plan for treatment: DC today

## 2020-12-21 NOTE — OP THERAPY DISCHARGE SUMMARY
Outpatient Physical Therapy  DISCHARGE SUMMARY NOTE      [facility] Physical [place] [address]  [address]  [place] [address]  Phone:  [phone]  Fax:  [phone]    Date of Visit: 12/21/2020    Patient: Anaya Cortes  YOB: 1985  MRN: [id number]     Referring Provider: Hussain Montalvo M.D.  [address]  [address]  [place] [address]   Referring Diagnosis Spinal stenosis, lumbar region without neurogenic claudication [M48.061];Other intervertebral disc displacement, lumbar region [M51.26]         Functional Assessment Used        Your patient is being discharged from Physical Therapy with the following comments:   · Goals met    Comments:  Pt has been seen for a total of 6 visits of skilled physical therapy and reports 80% improvement overall. Treatment consisted of emphasis on: Anabell extension program, pt education, core and posterior chain strengthening in addition to ergonomics training for office job. Pt reports has not experienced peripheralization since initiating physical therapy.     Pt reporting continued limitations with prolonged sitting; reports improvement up to 45 min tolerance before requiring position change (previously <20 min tolerance at eval). Pt has been successful in managing her symptoms and has met above listed goals. Pt will be DC-ed with independent hep.      Core strength (ball bridge for endurance): 49 sec at Full AROM (normal >2 min)       Goals:   Short Term Goals:   1. Pt will be independent with written HEP. (Met)  2. Pt will be indep with centralization/ peripheralization education. (Met)  3. Pt will be able to self manage n/t in RLE. (Met)  4. Pt will perform ergonomic adjustments to work desk. (Met)  Short term goal time span:  2-4 weeks      Long Term Goals:    1. Pt will be independent with written HEP. (Met)  2. Pt will demonstrate sig improvement in RMQ >/= DAMARIS/MCID (20.83). (Met)  3. Pt will demonstrate postural  improvements with min cuing in clinic. (Met)  4. Pt will be able to sit for 1 hour or greater without onset of s/s in order to return to ADL's and hobbies. (Partially met)     Long term goal time span:  6-8 weeks    Recommendations:  Pt has satisfied above listed goals and is ready to DC today with independent hep. All questions answered and verbally reviewed independent hep with pt. Expressed to pt that she can return to skilled physical therapy if condition should change or worsen in future.    Joshua Arshad, PT    Date: 12/21/2020

## 2021-01-07 ENCOUNTER — OFFICE VISIT (OUTPATIENT)
Dept: PHYSICAL MEDICINE AND REHAB | Facility: MEDICAL CENTER | Age: 36
End: 2021-01-07
Payer: COMMERCIAL

## 2021-01-07 VITALS
OXYGEN SATURATION: 92 % | WEIGHT: 233.91 LBS | DIASTOLIC BLOOD PRESSURE: 84 MMHG | BODY MASS INDEX: 37.59 KG/M2 | HEART RATE: 84 BPM | HEIGHT: 66 IN | TEMPERATURE: 97.3 F | SYSTOLIC BLOOD PRESSURE: 128 MMHG

## 2021-01-07 DIAGNOSIS — M51.26 LUMBAR DISC HERNIATION: ICD-10-CM

## 2021-01-07 DIAGNOSIS — M48.061 SPINAL STENOSIS OF LUMBAR REGION, UNSPECIFIED WHETHER NEUROGENIC CLAUDICATION PRESENT: ICD-10-CM

## 2021-01-07 DIAGNOSIS — M48.061 NEURAL FORAMINAL STENOSIS OF LUMBAR SPINE: ICD-10-CM

## 2021-01-07 PROCEDURE — 99213 OFFICE O/P EST LOW 20 MIN: CPT | Performed by: PHYSICAL MEDICINE & REHABILITATION

## 2021-01-07 ASSESSMENT — PAIN SCALES - GENERAL: PAINLEVEL: NO PAIN

## 2021-01-07 ASSESSMENT — PATIENT HEALTH QUESTIONNAIRE - PHQ9: CLINICAL INTERPRETATION OF PHQ2 SCORE: 0

## 2021-01-07 ASSESSMENT — FIBROSIS 4 INDEX: FIB4 SCORE: 0.78

## 2021-01-07 NOTE — PROGRESS NOTES
Follow-up patient note    Physiatry (physical medicine and  Rehabilitation), interventional spine and sports medicine    Date of Service: 1/07/2021    Chief complaint:   Chief Complaint   Patient presents with   • Follow-Up     Back pain       HISTORY    HPI: Anaya Cortes 35 y.o. female who presents today for evaluation of low back pain.  She reports that she has had back pain since about age 14-15 years old.    Since the last visit, she has been to physical therapy.  From what she reports, her back pain is significantly improved.  She is feeling very positive about this.  At home, she is doing her home exercise program for 20 minutes daily.  Pain is 0/10 on the NRS today.  No back pain, no symptoms into the lower extremities.  Pain into the right leg has resolved.    No bowel or bladder changes.  No weakness in the legs bilaterally.    She has had PT after an accident in 2017, this is a closed case.     ORT: 13; PHQ-9: 0    Medical records review:  I reviewed the note from the referring provider Mingo Brock M.D. dated 08/27/2020.  He ordered an MRI lumbar spine at that visit.    EMG Dr. KORTNEY Turpin MD 09/22/2020  DIAGNOSTIC INTERPRETATION:   Extensive electrodiagnostic studies were performed to the right upper and right lower extremities.  The results are as follows:  1.  Normal EMG and nerve conduction study right upper extremity.  2.  Normal EMG and nerve conduction study right lower extremity.    Previous treatments:    Physical Therapy: Not recently    Medications the patient is tried: NSAIDs when severe, uses a heating pad and ice.    Previous interventions: none    Previous surgeries to relieve the above pain:  none      ROS: Unchanged from 11/03/2020  Psych: history of depression    Red Flags ROS:   Fever, Chills, Sweats: Denies  Involuntary Weight Loss: Denies  Bladder Incontinence: Denies  Bowel Incontinence: Denies  Saddle Anesthesia: Denies    All other systems reviewed and negative.        PMHx:   Past Medical History:   Diagnosis Date   • ASTHMA    • Migraine        PSHx:   Past Surgical History:   Procedure Laterality Date   • TONSILLECTOMY         Family history   History reviewed. No pertinent family history.      Medications:   Current Outpatient Medications   Medication   • ibuprofen (MOTRIN) 200 MG Tab   • Non Formulary Request     No current facility-administered medications for this visit.        Allergies:   Allergies   Allergen Reactions   • Vicodin [Hydrocodone-Acetaminophen] Nausea       Social Hx:   Social History     Socioeconomic History   • Marital status: Single     Spouse name: Not on file   • Number of children: Not on file   • Years of education: Not on file   • Highest education level: Not on file   Occupational History   • Not on file   Social Needs   • Financial resource strain: Not on file   • Food insecurity     Worry: Not on file     Inability: Not on file   • Transportation needs     Medical: Not on file     Non-medical: Not on file   Tobacco Use   • Smoking status: Former Smoker     Quit date: 2007     Years since quittin.5   • Smokeless tobacco: Never Used   Substance and Sexual Activity   • Alcohol use: Not Currently     Comment: occ   • Drug use: No   • Sexual activity: Yes   Lifestyle   • Physical activity     Days per week: Not on file     Minutes per session: Not on file   • Stress: Not on file   Relationships   • Social connections     Talks on phone: Not on file     Gets together: Not on file     Attends [employer] service: Not on file     Active member of club or organization: Not on file     Attends meetings of clubs or organizations: Not on file     Relationship status: Not on file   • Intimate partner violence     Fear of current or ex partner: Not on file     Emotionally abused: Not on file     Physically abused: Not on file     Forced sexual activity: Not on file   Other Topics Concern   •  Service No   • Blood Transfusions No   •  "Caffeine Concern No   • Occupational Exposure No   • Hobby Hazards No   • Sleep Concern Yes   • Stress Concern Yes   • Weight Concern Yes   • Special Diet No   • Back Care No   • Exercise No   • Bike Helmet No   • Seat Belt Yes   • Self-Exams Yes   Social History Narrative   • Not on file         EXAMINATION     Physical Exam:   Vitals: /84 (BP Location: Right arm, Patient Position: Sitting, BP Cuff Size: Adult long)   Pulse 84   Temp 36.3 °C (97.3 °F) (Temporal)   Ht 1.676 m (5' 6\")   Wt 106.1 kg (233 lb 14.5 oz)   SpO2 92%     Constitutional:   Body Habitus: Body mass index is 37.75 kg/m².  Cooperation: Fully cooperates with exam  Appearance: Well-groomed, well-nourished, not disheveled, in no acute distress    Eyes: No scleral icterus, no proptosis     ENT -no obvious auditory deficits, wearing a face mask    Skin -no rashes or lesions noted     Respiratory-  breathing comfortable on room air, no audible wheezing    Cardiovascular- capillary refills less than 2 seconds. No lower extremity edema is noted.     Psychiatric- alert and oriented ×3. Normal affect.     Gait - normal gait, no use of ambulatory device, nonantalgic.    Musculoskeletal -   Cervical spine   Inspection: No deformities of the skin over the cervical spine. No rashes or lesions.    full  A/P ROM in all directions, without  pain      Spurling’s sign: negative bilaterally    No signs of muscular atrophy in bilateral upper extremities     No tenderness to palpation of the cervical facets    Thoracic/Lumbar Spine/Sacral Spine/Hips   Inspection: No evidence of atrophy in bilateral lower extremities throughout     ROM: decreased AROM with flexion, within normal limits for extension, lateral flexion, and rotation bilaterally, without pain    Palpation:   No tenderness to palpation in lumbar paraspinals.  No PSIS tenderness    Lumbar spine Special tests  Neuro tension  Seated SLR negative bilaterally      Neuro       Key points for the " international standards for neurological classification of spinal cord injury (ISNCSCI) to light touch.     Dermatome R L   L2 2 2   L3 2 2   L4 2 2   L5 2 2   S1 2 2   S2 2 2       Motor Exam Lower Extremities    ? Myotome R L   Hip flexion L2 5 5   Knee extension L3 5 5   Ankle dorsiflexion L4 5 5   Toe extension L5 5 5   Ankle plantarflexion S1 5 5       Reflexes  ?  R L   Patella  2+ 2+   Achilles   2+ 2+       MEDICAL DECISION MAKING    Medical records review: see under HPI section.     DATA    Labs:   Lab Results   Component Value Date/Time    SODIUM 140 08/27/2020 09:25 AM    POTASSIUM 4.3 08/27/2020 09:25 AM    CHLORIDE 106 08/27/2020 09:25 AM    CO2 22 08/27/2020 09:25 AM    ANION 12.0 08/27/2020 09:25 AM    GLUCOSE 94 08/27/2020 09:25 AM    BUN 14 08/27/2020 09:25 AM    CREATININE 0.75 08/27/2020 09:25 AM    CREATININE 0.7 03/17/2009 03:30 PM    CALCIUM 9.8 08/27/2020 09:25 AM    ASTSGOT 23 05/18/2019 09:15 AM    ALTSGPT 10 08/27/2020 09:25 AM    TBILIRUBIN 0.4 05/18/2019 09:15 AM    ALBUMIN 4.5 05/18/2019 09:15 AM    TOTPROTEIN 7.3 05/18/2019 09:15 AM    GLOBULIN 2.8 05/18/2019 09:15 AM    AGRATIO 1.6 05/18/2019 09:15 AM       No results found for: PROTHROMBTM, INR     Lab Results   Component Value Date/Time    WBC 7.5 08/27/2020 09:25 AM    RBC 5.26 08/27/2020 09:25 AM    HEMOGLOBIN 16.4 (H) 08/27/2020 09:25 AM    HEMATOCRIT 49.3 (H) 08/27/2020 09:25 AM    MCV 93.7 08/27/2020 09:25 AM    MCH 31.2 08/27/2020 09:25 AM    MCHC 33.3 (L) 08/27/2020 09:25 AM    MPV 10.5 08/27/2020 09:25 AM    NEUTSPOLYS 61.20 08/27/2020 09:25 AM    LYMPHOCYTES 29.50 08/27/2020 09:25 AM    MONOCYTES 6.30 08/27/2020 09:25 AM    EOSINOPHILS 1.90 08/27/2020 09:25 AM    BASOPHILS 0.70 08/27/2020 09:25 AM        Lab Results   Component Value Date/Time    HBA1C 5.5 08/27/2020 09:25 AM        Imaging: I personally reviewed following images, these are my reads  MRI lumbar spine 09/08/2020  At L5-S1, central disc protrusion contacts the  S1 nerve roots bilaterally.  Mild central canal stenosis.  Mild foraminal stenosis bilaterally  No significant central or foraminal narrowing at T12-L1, L1-2, L2-3, L3-4, and L4-5    IMAGING radiology reads. I reviewed the following radiology reads                                   Results for orders placed during the hospital encounter of 09/08/20   MR-CERVICAL SPINE-W/O    Impression 1.  No significant central canal or neural foraminal narrowing.    2.  Mild loss of the normal cervical lordosis.           Results for orders placed during the hospital encounter of 09/08/20   MR-LUMBAR SPINE-W/O    Impression L5-S1 central disc extrusion which contacts and deforms the ventral surface of the thecal sac. This also contacts the S1 nerve roots bilaterally. There is mild central canal narrowing. There is mild bilateral neural foraminal narrowing.                        Diagnosis   Visit Diagnoses     ICD-10-CM   1. Lumbar disc herniation  M51.26   2. Neural foraminal stenosis of lumbar spine  M99.73   3. Spinal stenosis of lumbar region, unspecified whether neurogenic claudication present  M48.061         ASSESSMENT:  Anaya Regaladorosales Cortes 35 y.o. female seen for above     Anaya was seen today for follow-up.    Diagnoses and all orders for this visit:    Lumbar disc herniation    Neural foraminal stenosis of lumbar spine    Spinal stenosis of lumbar region, unspecified whether neurogenic claudication present  Comments:  Mild       1. She has done well with physical therapy.  She will continue her home exercise program on a daily basis.  2. She will return to clinic if symptoms return.      Follow-up: Return if symptoms worsen or fail to improve.    Thank you very much for asking me to participate in Anaya Cortes's care.  Please contact me with any questions or concerns.    Please note that this dictation was created using voice recognition software. I have made every reasonable attempt to correct obvious  errors but there may be errors of grammar and content that I may have overlooked prior to finalization of this note.      Hussain Montalvo MD  Physical Medicine and Rehabilitation  Interventional Spine and Sports Physiatry  [facility]       Mingo Thibodeaux M.D.

## 2021-08-11 SDOH — HEALTH STABILITY: PHYSICAL HEALTH: ON AVERAGE, HOW MANY MINUTES DO YOU ENGAGE IN EXERCISE AT THIS LEVEL?: 0 MIN

## 2021-08-11 SDOH — ECONOMIC STABILITY: HOUSING INSECURITY
IN THE LAST 12 MONTHS, WAS THERE A TIME WHEN YOU DID NOT HAVE A STEADY PLACE TO SLEEP OR SLEPT IN A SHELTER (INCLUDING NOW)?: NO

## 2021-08-11 SDOH — ECONOMIC STABILITY: HOUSING INSECURITY: IN THE LAST 12 MONTHS, HOW MANY PLACES HAVE YOU LIVED?: 1

## 2021-08-11 SDOH — ECONOMIC STABILITY: INCOME INSECURITY: IN THE LAST 12 MONTHS, WAS THERE A TIME WHEN YOU WERE NOT ABLE TO PAY THE MORTGAGE OR RENT ON TIME?: NO

## 2021-08-11 SDOH — HEALTH STABILITY: PHYSICAL HEALTH: ON AVERAGE, HOW MANY DAYS PER WEEK DO YOU ENGAGE IN MODERATE TO STRENUOUS EXERCISE (LIKE A BRISK WALK)?: 0 DAYS

## 2021-08-11 SDOH — ECONOMIC STABILITY: TRANSPORTATION INSECURITY
IN THE PAST 12 MONTHS, HAS THE LACK OF TRANSPORTATION KEPT YOU FROM MEDICAL APPOINTMENTS OR FROM GETTING MEDICATIONS?: NO

## 2021-08-11 SDOH — ECONOMIC STABILITY: FOOD INSECURITY: WITHIN THE PAST 12 MONTHS, YOU WORRIED THAT YOUR FOOD WOULD RUN OUT BEFORE YOU GOT MONEY TO BUY MORE.: NEVER TRUE

## 2021-08-11 SDOH — ECONOMIC STABILITY: TRANSPORTATION INSECURITY
IN THE PAST 12 MONTHS, HAS LACK OF RELIABLE TRANSPORTATION KEPT YOU FROM MEDICAL APPOINTMENTS, MEETINGS, WORK OR FROM GETTING THINGS NEEDED FOR DAILY LIVING?: NO

## 2021-08-11 SDOH — ECONOMIC STABILITY: TRANSPORTATION INSECURITY
IN THE PAST 12 MONTHS, HAS LACK OF TRANSPORTATION KEPT YOU FROM MEETINGS, WORK, OR FROM GETTING THINGS NEEDED FOR DAILY LIVING?: NO

## 2021-08-11 SDOH — HEALTH STABILITY: MENTAL HEALTH
STRESS IS WHEN SOMEONE FEELS TENSE, NERVOUS, ANXIOUS, OR CAN'T SLEEP AT NIGHT BECAUSE THEIR MIND IS TROUBLED. HOW STRESSED ARE YOU?: ONLY A LITTLE

## 2021-08-11 SDOH — ECONOMIC STABILITY: FOOD INSECURITY: WITHIN THE PAST 12 MONTHS, THE FOOD YOU BOUGHT JUST DIDN'T LAST AND YOU DIDN'T HAVE MONEY TO GET MORE.: NEVER TRUE

## 2021-08-11 ASSESSMENT — SOCIAL DETERMINANTS OF HEALTH (SDOH)
HOW MANY DRINKS CONTAINING ALCOHOL DO YOU HAVE ON A TYPICAL DAY WHEN YOU ARE DRINKING: PATIENT DECLINED
ARE YOU MARRIED, WIDOWED, DIVORCED, SEPARATED, NEVER MARRIED, OR LIVING WITH A PARTNER?: NEVER MARRIED
HOW OFTEN DO YOU ATTENT MEETINGS OF THE CLUB OR ORGANIZATION YOU BELONG TO?: MORE THAN 4 TIMES PER YEAR
HOW OFTEN DO YOU HAVE A DRINK CONTAINING ALCOHOL: PATIENT DECLINED
DO YOU BELONG TO ANY CLUBS OR ORGANIZATIONS SUCH AS CHURCH GROUPS UNIONS, FRATERNAL OR ATHLETIC GROUPS, OR SCHOOL GROUPS?: YES
HOW OFTEN DO YOU GET TOGETHER WITH FRIENDS OR RELATIVES?: TWICE A WEEK
HOW OFTEN DO YOU ATTEND CHURCH OR RELIGIOUS SERVICES?: MORE THAN 4 TIMES PER YEAR
IN A TYPICAL WEEK, HOW MANY TIMES DO YOU TALK ON THE PHONE WITH FAMILY, FRIENDS, OR NEIGHBORS?: MORE THAN THREE TIMES A WEEK
WITHIN THE PAST 12 MONTHS, YOU WORRIED THAT YOUR FOOD WOULD RUN OUT BEFORE YOU GOT THE MONEY TO BUY MORE: NEVER TRUE
ARE YOU MARRIED, WIDOWED, DIVORCED, SEPARATED, NEVER MARRIED, OR LIVING WITH A PARTNER?: NEVER MARRIED
HOW OFTEN DO YOU HAVE SIX OR MORE DRINKS ON ONE OCCASION: PATIENT DECLINED
HOW OFTEN DO YOU GET TOGETHER WITH FRIENDS OR RELATIVES?: TWICE A WEEK
HOW OFTEN DO YOU ATTENT MEETINGS OF THE CLUB OR ORGANIZATION YOU BELONG TO?: MORE THAN 4 TIMES PER YEAR
DO YOU BELONG TO ANY CLUBS OR ORGANIZATIONS SUCH AS CHURCH GROUPS UNIONS, FRATERNAL OR ATHLETIC GROUPS, OR SCHOOL GROUPS?: YES
HOW OFTEN DO YOU ATTEND CHURCH OR RELIGIOUS SERVICES?: MORE THAN 4 TIMES PER YEAR
IN A TYPICAL WEEK, HOW MANY TIMES DO YOU TALK ON THE PHONE WITH FAMILY, FRIENDS, OR NEIGHBORS?: MORE THAN THREE TIMES A WEEK

## 2021-08-11 ASSESSMENT — LIFESTYLE VARIABLES
HOW OFTEN DO YOU HAVE A DRINK CONTAINING ALCOHOL: PATIENT DECLINED
HOW MANY STANDARD DRINKS CONTAINING ALCOHOL DO YOU HAVE ON A TYPICAL DAY: PATIENT DECLINED
HOW OFTEN DO YOU HAVE SIX OR MORE DRINKS ON ONE OCCASION: PATIENT DECLINED

## 2021-08-18 ENCOUNTER — HOSPITAL ENCOUNTER (OUTPATIENT)
Dept: LAB | Facility: MEDICAL CENTER | Age: 36
End: 2021-08-18
Attending: INTERNAL MEDICINE
Payer: COMMERCIAL

## 2021-08-18 ENCOUNTER — OFFICE VISIT (OUTPATIENT)
Dept: MEDICAL GROUP | Facility: PHYSICIAN GROUP | Age: 36
End: 2021-08-18
Payer: COMMERCIAL

## 2021-08-18 VITALS
RESPIRATION RATE: 16 BRPM | WEIGHT: 231 LBS | DIASTOLIC BLOOD PRESSURE: 86 MMHG | HEART RATE: 80 BPM | BODY MASS INDEX: 37.12 KG/M2 | HEIGHT: 66 IN | TEMPERATURE: 97.7 F | SYSTOLIC BLOOD PRESSURE: 124 MMHG

## 2021-08-18 DIAGNOSIS — Z23 NEED FOR VACCINATION: ICD-10-CM

## 2021-08-18 DIAGNOSIS — E66.3 OVERWEIGHT: ICD-10-CM

## 2021-08-18 DIAGNOSIS — D72.829 LEUKOCYTOSIS, UNSPECIFIED TYPE: ICD-10-CM

## 2021-08-18 DIAGNOSIS — Z00.00 WELLNESS EXAMINATION: ICD-10-CM

## 2021-08-18 DIAGNOSIS — G43.001 MIGRAINE WITHOUT AURA AND WITH STATUS MIGRAINOSUS, NOT INTRACTABLE: Chronic | ICD-10-CM

## 2021-08-18 DIAGNOSIS — R82.90 ABNORMAL URINALYSIS: ICD-10-CM

## 2021-08-18 PROBLEM — G43.909 MIGRAINE: Chronic | Status: ACTIVE | Noted: 2019-06-24

## 2021-08-18 LAB
25(OH)D3 SERPL-MCNC: 26 NG/ML (ref 30–100)
ALT SERPL-CCNC: 21 U/L (ref 2–50)
ANION GAP SERPL CALC-SCNC: 11 MMOL/L (ref 7–16)
APPEARANCE UR: CLEAR
BACTERIA #/AREA URNS HPF: ABNORMAL /HPF
BILIRUB UR QL STRIP.AUTO: NEGATIVE
BUN SERPL-MCNC: 15 MG/DL (ref 8–22)
CALCIUM SERPL-MCNC: 9.2 MG/DL (ref 8.5–10.5)
CHLORIDE SERPL-SCNC: 104 MMOL/L (ref 96–112)
CHOLEST SERPL-MCNC: 142 MG/DL (ref 100–199)
CO2 SERPL-SCNC: 23 MMOL/L (ref 20–33)
COLOR UR: YELLOW
CREAT SERPL-MCNC: 0.76 MG/DL (ref 0.5–1.4)
EPI CELLS #/AREA URNS HPF: ABNORMAL /HPF
ERYTHROCYTE [DISTWIDTH] IN BLOOD BY AUTOMATED COUNT: 43 FL (ref 35.9–50)
EST. AVERAGE GLUCOSE BLD GHB EST-MCNC: 94 MG/DL
GLUCOSE SERPL-MCNC: 80 MG/DL (ref 65–99)
GLUCOSE UR STRIP.AUTO-MCNC: NEGATIVE MG/DL
HBA1C MFR BLD: 4.9 % (ref 4–5.6)
HCT VFR BLD AUTO: 49.2 % (ref 37–47)
HDLC SERPL-MCNC: 43 MG/DL
HGB BLD-MCNC: 16.3 G/DL (ref 12–16)
HYALINE CASTS #/AREA URNS LPF: ABNORMAL /LPF
KETONES UR STRIP.AUTO-MCNC: NEGATIVE MG/DL
LDLC SERPL CALC-MCNC: 82 MG/DL
LEUKOCYTE ESTERASE UR QL STRIP.AUTO: ABNORMAL
MCH RBC QN AUTO: 31 PG (ref 27–33)
MCHC RBC AUTO-ENTMCNC: 33.1 G/DL (ref 33.6–35)
MCV RBC AUTO: 93.5 FL (ref 81.4–97.8)
MICRO URNS: ABNORMAL
NITRITE UR QL STRIP.AUTO: NEGATIVE
PH UR STRIP.AUTO: 6 [PH] (ref 5–8)
PLATELET # BLD AUTO: 313 K/UL (ref 164–446)
PMV BLD AUTO: 10.6 FL (ref 9–12.9)
POTASSIUM SERPL-SCNC: 4 MMOL/L (ref 3.6–5.5)
PROT UR QL STRIP: NEGATIVE MG/DL
RBC # BLD AUTO: 5.26 M/UL (ref 4.2–5.4)
RBC # URNS HPF: ABNORMAL /HPF
RBC UR QL AUTO: NEGATIVE
SODIUM SERPL-SCNC: 138 MMOL/L (ref 135–145)
SP GR UR STRIP.AUTO: 1.02
TRIGL SERPL-MCNC: 84 MG/DL (ref 0–149)
TSH SERPL DL<=0.005 MIU/L-ACNC: 1.62 UIU/ML (ref 0.38–5.33)
UROBILINOGEN UR STRIP.AUTO-MCNC: 0.2 MG/DL
VIT B12 SERPL-MCNC: 838 PG/ML (ref 211–911)
WBC # BLD AUTO: 11.1 K/UL (ref 4.8–10.8)
WBC #/AREA URNS HPF: ABNORMAL /HPF

## 2021-08-18 PROCEDURE — 80048 BASIC METABOLIC PNL TOTAL CA: CPT

## 2021-08-18 PROCEDURE — 36415 COLL VENOUS BLD VENIPUNCTURE: CPT

## 2021-08-18 PROCEDURE — 85027 COMPLETE CBC AUTOMATED: CPT

## 2021-08-18 PROCEDURE — 84460 ALANINE AMINO (ALT) (SGPT): CPT

## 2021-08-18 PROCEDURE — 82306 VITAMIN D 25 HYDROXY: CPT

## 2021-08-18 PROCEDURE — 81001 URINALYSIS AUTO W/SCOPE: CPT

## 2021-08-18 PROCEDURE — 82607 VITAMIN B-12: CPT

## 2021-08-18 PROCEDURE — 99395 PREV VISIT EST AGE 18-39: CPT | Mod: 25 | Performed by: INTERNAL MEDICINE

## 2021-08-18 PROCEDURE — 80061 LIPID PANEL: CPT

## 2021-08-18 PROCEDURE — 83036 HEMOGLOBIN GLYCOSYLATED A1C: CPT

## 2021-08-18 PROCEDURE — 84443 ASSAY THYROID STIM HORMONE: CPT

## 2021-08-18 PROCEDURE — 90471 IMMUNIZATION ADMIN: CPT | Performed by: INTERNAL MEDICINE

## 2021-08-18 PROCEDURE — 90715 TDAP VACCINE 7 YRS/> IM: CPT | Performed by: INTERNAL MEDICINE

## 2021-08-18 NOTE — ASSESSMENT & PLAN NOTE
This is chronic condition.   Pt is aware of elevated BMI.  Brief discussion with the patient regarding diet, exercise, and lifestyle modification to help achieve and maintain healthy weight

## 2021-08-18 NOTE — ASSESSMENT & PLAN NOTE
Patient reported that symptoms discontinuing the alcohol intake she has not had any migraine since last 2 years.

## 2021-08-18 NOTE — PROGRESS NOTES
"CC: Annual wellness exam      HPI: This is a 35 y.o. pt.  Pt's medical history is notable for:     Wellness examination  Patient presents today for annual wellness exam.  Patient feels well without any specific complaint.  Patient has not had lab done.    Migraine  Patient reported that symptoms discontinuing the alcohol intake she has not had any migraine since last 2 years.    Overweight  This is chronic condition.   Pt is aware of elevated BMI.  Brief discussion with the patient regarding diet, exercise, and lifestyle modification to help achieve and maintain healthy weight                REVIEW OF SYSTEMS:     Constitutional:  no fever / chills   Neurologic: no headaches  Eyes: no changes in vision  ENT: no sore throat, no hearing loss  CV:  no chest pain, no palpitations  Pulmonary: no SOB, no cough          Allergies: Vicodin [hydrocodone-acetaminophen]    No current [facility]-ordered outpatient medications on file.     No current [facility]-ordered facility-administered medications on file.       Past Medical, Social, and Family history reviewed and updated in EPIC     ------------------------------------------------------------------------------     PHYSICAL EXAM:   Vitals:    08/18/21 0702   BP: 124/86   Pulse: 80   Resp: 16   Temp: 36.5 °C (97.7 °F)        Vitals:    08/18/21 0702   BP: 124/86   Weight: 105 kg (231 lb)   Height: 1.676 m (5' 6\")         Body mass index is 37.28 kg/m².    Constitutional: no acute distress  CV: heart RRR  Resp: normal effort, no wheezing or rales.  GI: abdomen soft, no obvious mass, no tenderness  Neuro: CN 2-12 grossly intact        -----------------------------------------------------------------------------    ASSESSMENT:   1. Wellness examination  HEMOGLOBIN A1C    ALANINE AMINO-TRANS    Basic Metabolic Panel    CBC WITHOUT DIFFERENTIAL    Lipid Profile    TSH    URINALYSIS    VITAMIN D,25 HYDROXY    VITAMIN B12   2. Migraine without aura and with status migrainosus, not intractable "     3. Overweight     4. Need for vaccination  TDAP VACCINE =>6YO IM           MEDICAL DECISION MAKING: DISCUSSION / STATUS / PLAN:    Medically patient is stable  Advised the patient regarding healthy diet and exercise and the patient try to lose some weight.  Lab tests ordered today.  Advised the patient to call for the results after few days  Tdap ordered.     Return in about 1 year (around 8/18/2022).     -If any problems should arise, patient was advised to contact our office or go to ER to be evaluated.    Please note that this dictation was created using voice recognition software. I have made every reasonable attempt to correct obvious errors, but it is possible there are errors of grammar and possibly content that I did not discover before finalizing the note.

## 2021-08-18 NOTE — ASSESSMENT & PLAN NOTE
Patient presents today for annual wellness exam.  Patient feels well without any specific complaint.  Patient has not had lab done.

## 2021-08-19 ENCOUNTER — TELEPHONE (OUTPATIENT)
Dept: MEDICAL GROUP | Facility: PHYSICIAN GROUP | Age: 36
End: 2021-08-19

## 2021-08-19 ENCOUNTER — HOSPITAL ENCOUNTER (OUTPATIENT)
Facility: MEDICAL CENTER | Age: 36
End: 2021-08-19
Attending: INTERNAL MEDICINE
Payer: COMMERCIAL

## 2021-08-19 DIAGNOSIS — R82.90 ABNORMAL URINALYSIS: ICD-10-CM

## 2021-08-19 PROCEDURE — 87086 URINE CULTURE/COLONY COUNT: CPT

## 2021-08-19 NOTE — TELEPHONE ENCOUNTER
Regarding: FW: Tetanus Shot  Recommend patient to return to the lab for urine culture to rule out any possible urinary tract infection.  Lab has been ordered.  ----- Message -----  From: Yulia Steward Med Ass't  Sent: 8/18/2021   5:38 PM PDT  To: Mingo Brock M.D.  Subject: Tetanus Shot                                     ----- Message from Kali Manzo Ass't sent at 8/18/2021  5:38 PM PDT -----       ----- Message from Anaya Cortes to Mingo Brock M.D. sent at 8/18/2021 12:18 PM -----   I would love the link. Thank you.       ----- Message -----       From:Nurse Kalpana JOHNSON       Sent:8/18/2021 10:30 AM PDT         To:Anaya Cortes    Subject:Tetanus Shot    Anaya,    Yes, you were given Tdap which is tetanus and pertussis. I can send you a link to the correct Vaccine Information Sheet or you can find it on the CDC website, let me know what you would like to do.    Kalpana BARAHONA, BSN       ----- Message -----       From:Anaya Cortes       Sent:8/18/2021  9:37 AM PDT         To:Physician Mingo Brock    Subject:Tetanus Shot    I am just wanting to verify that is the shot I received. The paper given on side effects was for the pneumococcal shot.

## 2021-08-21 LAB
BACTERIA UR CULT: NORMAL
SIGNIFICANT IND 70042: NORMAL
SITE SITE: NORMAL
SOURCE SOURCE: NORMAL

## 2021-09-09 ENCOUNTER — HOSPITAL ENCOUNTER (OUTPATIENT)
Facility: MEDICAL CENTER | Age: 36
End: 2021-09-09
Attending: PHYSICIAN ASSISTANT
Payer: COMMERCIAL

## 2021-09-09 ENCOUNTER — OFFICE VISIT (OUTPATIENT)
Dept: URGENT CARE | Facility: PHYSICIAN GROUP | Age: 36
End: 2021-09-09
Payer: COMMERCIAL

## 2021-09-09 VITALS
HEIGHT: 66 IN | HEART RATE: 101 BPM | BODY MASS INDEX: 37.77 KG/M2 | OXYGEN SATURATION: 93 % | DIASTOLIC BLOOD PRESSURE: 66 MMHG | SYSTOLIC BLOOD PRESSURE: 128 MMHG | RESPIRATION RATE: 17 BRPM | WEIGHT: 235 LBS | TEMPERATURE: 98.8 F

## 2021-09-09 DIAGNOSIS — Z20.822 SUSPECTED COVID-19 VIRUS INFECTION: ICD-10-CM

## 2021-09-09 DIAGNOSIS — R05.9 COUGH: ICD-10-CM

## 2021-09-09 DIAGNOSIS — R50.9 FEVER, UNSPECIFIED FEVER CAUSE: ICD-10-CM

## 2021-09-09 DIAGNOSIS — R52 BODY ACHES: ICD-10-CM

## 2021-09-09 LAB — COVID ORDER STATUS COVID19: NORMAL

## 2021-09-09 PROCEDURE — U0005 INFEC AGEN DETEC AMPLI PROBE: HCPCS

## 2021-09-09 PROCEDURE — U0003 INFECTIOUS AGENT DETECTION BY NUCLEIC ACID (DNA OR RNA); SEVERE ACUTE RESPIRATORY SYNDROME CORONAVIRUS 2 (SARS-COV-2) (CORONAVIRUS DISEASE [COVID-19]), AMPLIFIED PROBE TECHNIQUE, MAKING USE OF HIGH THROUGHPUT TECHNOLOGIES AS DESCRIBED BY CMS-2020-01-R: HCPCS

## 2021-09-09 PROCEDURE — 99213 OFFICE O/P EST LOW 20 MIN: CPT | Mod: CS | Performed by: PHYSICIAN ASSISTANT

## 2021-09-09 ASSESSMENT — ENCOUNTER SYMPTOMS
CHILLS: 1
ABDOMINAL PAIN: 0
FEVER: 1
VOMITING: 0
CONSTIPATION: 0
SHORTNESS OF BREATH: 0
DIARRHEA: 0
NAUSEA: 0
MYALGIAS: 1
COUGH: 1
SORE THROAT: 1
EYE PAIN: 0
HEADACHES: 1

## 2021-09-09 NOTE — PROGRESS NOTES
Subjective:   Anaya Cortes is a 35 y.o. female who presents for Coronavirus Screening (,cough ,fever,headache)      HPI:  35-year-old female with close Covid positive contacts in her workplace presents for around 5 days of symptoms including cough, malaise, congestion, body aches, fever as well as headache.  Spouse is in clinic with similar symptoms.  [place] about positive work contacts and subsequently had first Covid vaccination at the end of last week.    Review of Systems   Constitutional: Positive for chills, fever and malaise/fatigue.   HENT: Positive for congestion and sore throat. Negative for ear pain.    Eyes: Negative for pain.   Respiratory: Positive for cough. Negative for shortness of breath.    Cardiovascular: Negative for chest pain.   Gastrointestinal: Negative for abdominal pain, constipation, diarrhea, nausea and vomiting.   Genitourinary: Negative for dysuria.   Musculoskeletal: Positive for myalgias.   Skin: Negative for rash.   Neurological: Positive for headaches.       Medications:    • This patient does not have an active medication from one of the medication groupers.    Allergies: Vicodin [hydrocodone-acetaminophen]    Problem List: Anaya Cortes does not have any pertinent problems on file.    Surgical History:  Past Surgical History:   Procedure Laterality Date   • TONSILLECTOMY     • TONSILLECTOMY         Past Social Hx: Anaya Cortes  reports that she quit smoking about 14 years ago. She has never used smokeless tobacco. She reports previous alcohol use. She reports that she does not use drugs.     Past Family Hx:  Anaya Cortes family history includes Asthma in her father; COPD in her maternal grandfather; Diabetes in her father; Hypertension in her father and mother.     Problem list, medications, and allergies reviewed by myself today in Epic.     Objective:     /66 (BP Location: Left arm, Patient Position: Sitting, BP Cuff Size: Adult)    "Pulse (!) 101   Temp 37.1 °C (98.8 °F) (Temporal)   Resp 17   Ht 1.676 m (5' 6\")   Wt 107 kg (235 lb)   SpO2 93%   BMI 37.93 kg/m²     Physical Exam  Vitals reviewed.   Constitutional:       Appearance: Normal appearance.   HENT:      Head: Normocephalic and atraumatic.      Right Ear: External ear normal.      Left Ear: External ear normal.      Nose: Nose normal.      Mouth/Throat:      Mouth: Mucous membranes are moist.   Eyes:      Conjunctiva/sclera: Conjunctivae normal.   Cardiovascular:      Rate and Rhythm: Normal rate and regular rhythm.   Pulmonary:      Effort: Pulmonary effort is normal.      Breath sounds: Normal breath sounds.   Skin:     General: Skin is warm and dry.      Capillary Refill: Capillary refill takes less than 2 seconds.   Neurological:      Mental Status: She is alert and oriented to person, place, and time.         Assessment/Plan:     Diagnosis and associated orders:     1. Fever, unspecified fever cause  COVID/SARS CoV-2 PCR   2. Cough     3. Body aches     4. Suspected COVID-19 virus infection        Comments/MDM:     • Pulse ox 93 in clinic, recommend getting pulse oximeter and present to Emergency Room if <=90% or worsening respiratory status  Patient's vital signs are reassuring and they appear hemodynamically stable and do not require higher level care at this time  I discussed self isolation and provided printed instructions (if applicable)  I discussed ER precautions and provided printed instructions (if applicable)  I educated the patient on possibility of a false-negative test and indications for repeat testing  I instructed the patient to try to follow up with their PCP (if applicable) for follow up care  I discussed the possibly of \"breakthrough infections\" in fully vaccinated people  The patient will receive results via [employer] (\"detected\" is a positive result, \"not detected\" indicates a negative result)  If requested, I provided the patient with a work note to " provide to their employer or school regarding returning to work and discontinuation of self isolation.  All questions were answered and patient demonstrated verbal understanding of above.  I followed all reasonable PPE precautions during this encounter including but not limited to use of an N95 mask, gloves, and gown if indicated.             Differential diagnosis, natural history, supportive care, and indications for immediate follow-up discussed.    Advised the patient to follow-up with the primary care physician for recheck, reevaluation, and consideration of further management.    Please note that this dictation was created using voice recognition software. I have made a reasonable attempt to correct obvious errors, but I expect that there are errors of grammar and possibly content that I did not discover before finalizing the note.    This note was electronically signed by Randolph Tyler PA-C

## 2021-09-10 LAB
SARS-COV-2 RNA RESP QL NAA+PROBE: DETECTED
SPECIMEN SOURCE: ABNORMAL

## 2021-09-13 ENCOUNTER — TELEPHONE (OUTPATIENT)
Dept: MEDICAL GROUP | Facility: PHYSICIAN GROUP | Age: 36
End: 2021-09-13

## 2021-09-13 DIAGNOSIS — Z11.59 SCREENING FOR VIRAL DISEASE: ICD-10-CM

## 2021-09-13 NOTE — TELEPHONE ENCOUNTER
Spoke with pt who will send a [employer] message with work's fax number.  I will fax over note and mail her a copy.  Informed her of pended COVID test and to make appt now if she needs the test, as it can take a few weeks to get appt.  No further questions.  Pt thanked me for my help.

## 2021-09-13 NOTE — TELEPHONE ENCOUNTER
----- Message from Mingo Brock M.D. sent at 9/13/2021 11:14 AM PDT -----  Regarding: FW: Dr koenig  Pls notify pt  Rec pt self quarantine for 14d  Note to be written: off work form 9.9- 9-23  Back to work 9.24.21  Also ordered a f.u covid test to be done in 2 wks. If negative pt may return to work    thx  ----- Message -----  From: Romulo Helm, Med Ass't  Sent: 9/13/2021  10:38 AM PDT  To: Mingo Brock M.D.  Subject: FW: Dr release                                     ----- Message -----  From: Anaya Cortes  Sent: 9/13/2021   8:55 AM PDT  To: [facility]  Subject: Dr release                                       I started showing symptoms for covid this last Wednesday. Was able to get into urgent care Thursday to get tested. Got my results back Friday that I was positive. At the urgent care I received a paper printout from the [facility] to give my boss, but my job is requiring a note that says the date that I can return to work. Can I get one from you?

## 2021-09-28 ENCOUNTER — TELEMEDICINE (OUTPATIENT)
Dept: MEDICAL GROUP | Facility: PHYSICIAN GROUP | Age: 36
End: 2021-09-28
Payer: COMMERCIAL

## 2021-09-28 VITALS — WEIGHT: 235 LBS | RESPIRATION RATE: 20 BRPM | BODY MASS INDEX: 37.77 KG/M2 | HEIGHT: 66 IN

## 2021-09-28 DIAGNOSIS — U07.1 COVID-19 VIRUS INFECTION: ICD-10-CM

## 2021-09-28 PROBLEM — Z02.9 ADMINISTRATIVE ENCOUNTER: Status: ACTIVE | Noted: 2021-08-18

## 2021-09-28 PROCEDURE — 99212 OFFICE O/P EST SF 10 MIN: CPT | Mod: 95 | Performed by: INTERNAL MEDICINE

## 2021-09-28 NOTE — ASSESSMENT & PLAN NOTE
Patient stated that her symptoms of headaches and fever started approximately on September 8, 2021.  Patient reported that she went to urgent care on September 9 and tested positive for Covid infection.  According to the urgent care note her O2 sat on room air was 93% during the visit.  The patient has not required oxygen since urgent care discharge.    Patient reported that she had 1 dose of Covid vaccine on September 3, 2021.    Patient was off work from September 8, 2021 to September 23, 2021    Patient reported that she has returned to work on September 24, 2021.  Patient denies fever chills.  The headaches has improved.  Patient also reported that the cough has improved.  The patient did have some shortness of breath with exertion however this has also improved    At this time as the patient denies fever chills shortness of breath wheezing, chest pain lightheadedness or near syncope.    Patient is requesting the unemployment paperwork to be filled out

## 2021-09-28 NOTE — PROGRESS NOTES
This visit was conducted via  [employer] Virtual Visit using secure and encrypted videoconferencing technology.   The patient's identity was confirmed and verbal consent was obtained for this virtual visit.  -------------------------------------------------------------------------------    CC: Request disability form to be filled out         HPI: This is a 35 y.o. pt.  Pt's medical history is notable for:      COVID-19 virus infection  Patient stated that her symptoms of headaches and fever started approximately on September 8, 2021.  Patient reported that she went to urgent care on September 9 and tested positive for Covid infection.  According to the urgent care note her O2 sat on room air was 93% during the visit.  The patient has not required oxygen since urgent care discharge.    Patient reported that she had 1 dose of Covid vaccine on September 3, 2021.    Patient was off work from September 8, 2021 to September 23, 2021    Patient reported that she has returned to work on September 24, 2021.  Patient denies fever chills.  The headaches has improved.  Patient also reported that the cough has improved.  The patient did have some shortness of breath with exertion however this has also improved    At this time as the patient denies fever chills shortness of breath wheezing, chest pain lightheadedness or near syncope.    Patient is requesting the unemployment paperwork to be filled out              REVIEW OF SYSTEMS:     Constitutional:  no fever / chills   ENT: no sore throat, no hearing loss  CV:  no chest pain, no palpitations  Pulmonary: no SOB, no cough          Allergies: Vicodin [hydrocodone-acetaminophen]    No current [facility]-ordered outpatient medications on file.     No current [facility]-ordered facility-administered medications on file.       Past Medical, Social, and Family history reviewed and updated in EPIC    ------------------------------------------------------------------    Vitals:    09/28/21 1125  "  Resp: 20     Vitals:    09/28/21 1125   Weight: 107 kg (235 lb)   Height: 1.676 m (5' 6\")        PHYSICAL EXAM:   Psych:  A&O x 3, mood and affect appropriate  Constitutional: no distress  Skin: No apparent rashes  ENMT: Lips without lesions. Phonation normal.  Respiratory: Unlabored respiratory effort      ---------------------------------------------------------------------    ASSESSMENT:   1.   COVID-19 virus infection        2. Administrative encounter    MEDICAL DECISION MAKING: DISCUSSION / STATUS / PLAN:    As above the patient reported that she has been off work since September 8, 2021 to September 23, 2021.  Disability form to be completed by this provider for those dates.  Patient has returned to work on September 24, 2021.  The present time the patient feels reasonably well.  She denies fever chills shortness of breath wheezing.  The patient reported that the cough is also improved.  Patient stated that has not required to take any medication recently.    Rec pt to cont using mask  Pt also consider completing covid vaccine at a later time.             -If any problems should arise, patient was advised to contact our office for followup or go to ER to be evaluated.    Please note that this dictation was created using voice recognition software. I have made every reasonable attempt to correct obvious errors, but it is possible there are errors of grammar and possibly content that I did not discover before finalizing the note.  "

## 2021-10-27 ENCOUNTER — HOSPITAL ENCOUNTER (OUTPATIENT)
Dept: LAB | Facility: MEDICAL CENTER | Age: 36
End: 2021-10-27
Attending: OBSTETRICS & GYNECOLOGY
Payer: COMMERCIAL

## 2021-10-27 PROCEDURE — 87624 HPV HI-RISK TYP POOLED RSLT: CPT

## 2021-10-27 PROCEDURE — 88175 CYTOPATH C/V AUTO FLUID REDO: CPT

## 2022-03-03 ENCOUNTER — OFFICE VISIT (OUTPATIENT)
Dept: MEDICAL GROUP | Facility: PHYSICIAN GROUP | Age: 37
End: 2022-03-03
Payer: COMMERCIAL

## 2022-03-03 VITALS
DIASTOLIC BLOOD PRESSURE: 82 MMHG | HEART RATE: 88 BPM | SYSTOLIC BLOOD PRESSURE: 120 MMHG | WEIGHT: 249 LBS | BODY MASS INDEX: 40.02 KG/M2 | RESPIRATION RATE: 18 BRPM | HEIGHT: 66 IN | TEMPERATURE: 98 F | OXYGEN SATURATION: 94 %

## 2022-03-03 DIAGNOSIS — H61.21 IMPACTED CERUMEN OF RIGHT EAR: ICD-10-CM

## 2022-03-03 DIAGNOSIS — G43.001 MIGRAINE WITHOUT AURA AND WITH STATUS MIGRAINOSUS, NOT INTRACTABLE: Chronic | ICD-10-CM

## 2022-03-03 PROBLEM — U07.1 COVID-19 VIRUS INFECTION: Status: RESOLVED | Noted: 2021-09-28 | Resolved: 2022-03-03

## 2022-03-03 PROCEDURE — 99214 OFFICE O/P EST MOD 30 MIN: CPT | Performed by: INTERNAL MEDICINE

## 2022-03-03 RX ORDER — ETONOGESTREL 68 MG/1
IMPLANT SUBCUTANEOUS
COMMUNITY
Start: 2019-12-20

## 2022-03-03 ASSESSMENT — PATIENT HEALTH QUESTIONNAIRE - PHQ9: CLINICAL INTERPRETATION OF PHQ2 SCORE: 0

## 2022-03-03 NOTE — PROGRESS NOTES
"PRIMARY CARE CLINIC VISIT  Chief Complaint   Patient presents with   • Follow-Up     Ear plugged up sign    History of Present Illness     Impacted cerumen of right ear  This is a new condition.  Patient reported reduced hearing since last couple weeks.  Patient reported mild pain yesterday but not today.  She tried to use the home ear lavage kit without success.  Patient reported history of earwax buildup previously.    Migraine  Chronic condition.  Intermittent.  At the present time patient asymptomatic.      Current Outpatient Medications on File Prior to Visit   Medication Sig Dispense Refill   • etonogestrel (NEXPLANON) 68 MG Implant implant        No current facility-administered medications on file prior to visit.        Allergies: Vicodin [hydrocodone-acetaminophen]    ROS  As per HPI above. All other systems reviewed and negative.      Past Medical, Social, and Family history reviewed and updated in EPIC     Objective     /82   Pulse 88   Temp 36.7 °C (98 °F) (Temporal)   Resp 18   Ht 1.676 m (5' 6\")   Wt 113 kg (249 lb)   SpO2 94%    Body mass index is 40.19 kg/m².    General: alert and oriented  Cardiovascular: regular rate and rhythm  Pulmonary: lungs : no wheezing   Gastrointestinal: BS present. No obvious mass noted  Right ear the external canal is impacted with cerumen.  Unable to visualize the eardrum.  Left ear examination unremarkable        Assessment and Plan     1. Impacted cerumen of right ear    - Ear Irrigation ([facility] Only); Future  We will reevaluate after the procedure.  2. Migraine without aura and with status migrainosus, not intractable  Chronic and stable condition.  Patient presently asymptomatic.  Continue to monitor.      Addendum note after the ear lavage procedure I did reexamine the right ear: It is now clear of wax.  There is no redness or inflammation.  The tympanic membrane is intact.  Patient reported that she hearing is now back to normal.  Patient was discharged in " stable condition.            Healthcare maintenance     There are no preventive care reminders to display for this patient.             Please note that this dictation was created using voice recognition software. I have made every reasonable attempt to correct obvious errors but there may be errors of grammar and content that I may have overlooked prior to finalization of this note.      Mingo Brock MD  Internal Medicine  [facility]

## 2022-03-03 NOTE — ASSESSMENT & PLAN NOTE
This is a new condition.  Patient reported reduced hearing since last couple weeks.  Patient reported mild pain yesterday but not today.  She tried to use the home ear lavage kit without success.  Patient reported history of earwax buildup previously.

## 2022-08-24 ENCOUNTER — OFFICE VISIT (OUTPATIENT)
Dept: MEDICAL GROUP | Facility: PHYSICIAN GROUP | Age: 37
End: 2022-08-24
Payer: COMMERCIAL

## 2022-08-24 VITALS
RESPIRATION RATE: 20 BRPM | WEIGHT: 256 LBS | SYSTOLIC BLOOD PRESSURE: 108 MMHG | HEART RATE: 80 BPM | OXYGEN SATURATION: 95 % | BODY MASS INDEX: 41.14 KG/M2 | DIASTOLIC BLOOD PRESSURE: 70 MMHG | TEMPERATURE: 97.8 F | HEIGHT: 66 IN

## 2022-08-24 DIAGNOSIS — Z00.00 WELL ADULT EXAM: ICD-10-CM

## 2022-08-24 DIAGNOSIS — J30.1 SEASONAL ALLERGIC RHINITIS DUE TO POLLEN: ICD-10-CM

## 2022-08-24 DIAGNOSIS — G43.001 MIGRAINE WITHOUT AURA AND WITH STATUS MIGRAINOSUS, NOT INTRACTABLE: Chronic | ICD-10-CM

## 2022-08-24 DIAGNOSIS — E66.9 OBESITY WITHOUT SERIOUS COMORBIDITY, UNSPECIFIED CLASSIFICATION, UNSPECIFIED OBESITY TYPE: ICD-10-CM

## 2022-08-24 PROCEDURE — 99395 PREV VISIT EST AGE 18-39: CPT | Performed by: INTERNAL MEDICINE

## 2022-08-24 RX ORDER — FLUTICASONE PROPIONATE 50 MCG
1 SPRAY, SUSPENSION (ML) NASAL DAILY
Qty: 16 G | Refills: 3 | Status: SHIPPED | OUTPATIENT
Start: 2022-08-24

## 2022-08-24 NOTE — ASSESSMENT & PLAN NOTE
This is a chronic condition.  The patient takes over-the-counter medication as needed.  At the present time the patient is asymptomatic.

## 2022-08-24 NOTE — ASSESSMENT & PLAN NOTE
Body mass index is 41.32 kg/m².   Brief discussion with the patient regarding diet, exercise, and lifestyle modification to help achieve and maintain healthy weight

## 2022-08-24 NOTE — PROGRESS NOTES
"PRIMARY CARE CLINIC VISIT  Chief Complaint   Patient presents with    Annual Exam         History of Present Illness     Obesity  Body mass index is 41.32 kg/m².   Brief discussion with the patient regarding diet, exercise, and lifestyle modification to help achieve and maintain healthy weight              Migraine  This is a chronic condition.  The patient takes over-the-counter medication as needed.  At the present time the patient is asymptomatic.    Allergic rhinitis  This is a chronic condition.  Patient denies fever chills shortness of breath wheezing or significant cough.  Patient reported mild sinus congestion.    Current Outpatient Medications on File Prior to Visit   Medication Sig Dispense Refill    multivitamin (THERAGRAN) Tab Take 1 Tablet by mouth every day.      etonogestrel (NEXPLANON) 68 MG Implant implant        No current facility-administered medications on file prior to visit.        Allergies: Vicodin [hydrocodone-acetaminophen]    ROS  As per HPI above. All other systems reviewed and negative.      Past Medical, Social, and Family history reviewed and updated in EPIC     Objective     /70 (BP Location: Left arm, Patient Position: Sitting, BP Cuff Size: Adult)   Pulse 80   Temp 36.6 °C (97.8 °F) (Temporal)   Resp 20   Ht 1.676 m (5' 6\")   Wt 116 kg (256 lb)   SpO2 95%    Body mass index is 41.32 kg/m².    General: alert in no apparent distress.  Cardiovascular: regular rate and rhythm  Pulmonary: lungs : no wheezing   Gastrointestinal: BS present. No obvious mass noted  Nose with mild mucus formation.  No purulent drainage noted  Neuro nonfocal        Assessment and Plan     1. Well adult exam  - HEMOGLOBIN A1C; Future  - ALANINE AMINO-TRANS; Future  - Basic Metabolic Panel; Future  - CBC WITHOUT DIFFERENTIAL; Future  - Lipid Profile; Future  - TSH; Future  - MICROALBUMIN CREAT RATIO URINE; Future  - VITAMIN D,25 HYDROXY; Future  Routine lab work ordered.  Advised the patient to " follow-up few days after lab test done.  2. Obesity without serious comorbidity, unspecified classification, unspecified obesity type  Recommend diet and exercise.  Patient was referred to the medical weight loss program.  3. Migraine without aura and with status migrainosus, not intractable  Patient presently asymptomatic.  Continue to monitor  4. Seasonal allergic rhinitis due to pollen  Fluticasone 1 spray twice daily recommended.  Follow-up if not better.  Other orders  - multivitamin (THERAGRAN) Tab; Take 1 Tablet by mouth every day.  - fluticasone (FLONASE) 50 MCG/ACT nasal spray; Administer 1 Spray into affected nostril(S) every day.  Dispense: 16 g; Refill: 3          Follow-up 6 months            Healthcare Maintenance     Health Maintenance Due   Topic Date Due    COVID-19 Vaccine (3 - Booster for Pfizer series) 03/29/2022     Recommended but the patient declined COVID booster vaccine.          Please note that this dictation was created using voice recognition software. I have made every reasonable attempt to correct obvious errors, but I expect that there are errors of grammar and possibly content that I did not discover before finalizing the note.    Mingo Brock MD  Internal Medicine  [facility]

## 2022-08-24 NOTE — ASSESSMENT & PLAN NOTE
This is a chronic condition.  Patient denies fever chills shortness of breath wheezing or significant cough.  Patient reported mild sinus congestion.

## 2022-08-25 ENCOUNTER — HOSPITAL ENCOUNTER (OUTPATIENT)
Dept: LAB | Facility: MEDICAL CENTER | Age: 37
End: 2022-08-25
Attending: INTERNAL MEDICINE
Payer: COMMERCIAL

## 2022-08-25 DIAGNOSIS — Z00.00 WELL ADULT EXAM: ICD-10-CM

## 2022-08-25 LAB
25(OH)D3 SERPL-MCNC: 53 NG/ML (ref 30–100)
ALT SERPL-CCNC: 18 U/L (ref 2–50)
ANION GAP SERPL CALC-SCNC: 10 MMOL/L (ref 7–16)
BUN SERPL-MCNC: 11 MG/DL (ref 8–22)
CALCIUM SERPL-MCNC: 9.3 MG/DL (ref 8.5–10.5)
CHLORIDE SERPL-SCNC: 103 MMOL/L (ref 96–112)
CHOLEST SERPL-MCNC: 137 MG/DL (ref 100–199)
CO2 SERPL-SCNC: 24 MMOL/L (ref 20–33)
CREAT SERPL-MCNC: 0.72 MG/DL (ref 0.5–1.4)
CREAT UR-MCNC: 55.16 MG/DL
ERYTHROCYTE [DISTWIDTH] IN BLOOD BY AUTOMATED COUNT: 40.6 FL (ref 35.9–50)
EST. AVERAGE GLUCOSE BLD GHB EST-MCNC: 94 MG/DL
GFR SERPLBLD CREATININE-BSD FMLA CKD-EPI: 112 ML/MIN/1.73 M 2
GLUCOSE SERPL-MCNC: 83 MG/DL (ref 65–99)
HBA1C MFR BLD: 4.9 % (ref 4–5.6)
HCT VFR BLD AUTO: 43.9 % (ref 37–47)
HDLC SERPL-MCNC: 39 MG/DL
HGB BLD-MCNC: 14.7 G/DL (ref 12–16)
LDLC SERPL CALC-MCNC: 68 MG/DL
MCH RBC QN AUTO: 30.6 PG (ref 27–33)
MCHC RBC AUTO-ENTMCNC: 33.5 G/DL (ref 33.6–35)
MCV RBC AUTO: 91.5 FL (ref 81.4–97.8)
MICROALBUMIN UR-MCNC: <1.2 MG/DL
MICROALBUMIN/CREAT UR: NORMAL MG/G (ref 0–30)
PLATELET # BLD AUTO: 354 K/UL (ref 164–446)
PMV BLD AUTO: 10.1 FL (ref 9–12.9)
POTASSIUM SERPL-SCNC: 4.2 MMOL/L (ref 3.6–5.5)
RBC # BLD AUTO: 4.8 M/UL (ref 4.2–5.4)
SODIUM SERPL-SCNC: 137 MMOL/L (ref 135–145)
TRIGL SERPL-MCNC: 150 MG/DL (ref 0–149)
TSH SERPL DL<=0.005 MIU/L-ACNC: 1.73 UIU/ML (ref 0.38–5.33)
WBC # BLD AUTO: 8 K/UL (ref 4.8–10.8)

## 2022-08-25 PROCEDURE — 36415 COLL VENOUS BLD VENIPUNCTURE: CPT

## 2022-08-25 PROCEDURE — 84443 ASSAY THYROID STIM HORMONE: CPT

## 2022-08-25 PROCEDURE — 82570 ASSAY OF URINE CREATININE: CPT

## 2022-08-25 PROCEDURE — 80048 BASIC METABOLIC PNL TOTAL CA: CPT

## 2022-08-25 PROCEDURE — 84460 ALANINE AMINO (ALT) (SGPT): CPT

## 2022-08-25 PROCEDURE — 85027 COMPLETE CBC AUTOMATED: CPT

## 2022-08-25 PROCEDURE — 82043 UR ALBUMIN QUANTITATIVE: CPT

## 2022-08-25 PROCEDURE — 83036 HEMOGLOBIN GLYCOSYLATED A1C: CPT

## 2022-08-25 PROCEDURE — 82306 VITAMIN D 25 HYDROXY: CPT

## 2022-08-25 PROCEDURE — 80061 LIPID PANEL: CPT

## 2022-11-09 ENCOUNTER — HOSPITAL ENCOUNTER (OUTPATIENT)
Dept: LAB | Facility: MEDICAL CENTER | Age: 37
End: 2022-11-09

## 2022-11-09 ENCOUNTER — HOSPITAL ENCOUNTER (OUTPATIENT)
Facility: MEDICAL CENTER | Age: 37
End: 2022-11-09
Payer: COMMERCIAL

## 2022-11-09 PROCEDURE — 88175 CYTOPATH C/V AUTO FLUID REDO: CPT

## 2022-11-10 LAB — CYTOLOGY REG CYTOL: NORMAL

## 2023-02-02 ENCOUNTER — TELEMEDICINE (OUTPATIENT)
Dept: MEDICAL GROUP | Facility: PHYSICIAN GROUP | Age: 38
End: 2023-02-02
Payer: COMMERCIAL

## 2023-02-02 VITALS — HEIGHT: 66 IN | WEIGHT: 235 LBS | BODY MASS INDEX: 37.77 KG/M2

## 2023-02-02 DIAGNOSIS — E66.9 OBESITY WITHOUT SERIOUS COMORBIDITY, UNSPECIFIED CLASSIFICATION, UNSPECIFIED OBESITY TYPE: Chronic | ICD-10-CM

## 2023-02-02 DIAGNOSIS — E78.5 DYSLIPIDEMIA: ICD-10-CM

## 2023-02-02 DIAGNOSIS — M54.16 LUMBAR RADICULOPATHY: Chronic | ICD-10-CM

## 2023-02-02 DIAGNOSIS — M48.061 SPINAL STENOSIS OF LUMBAR REGION, UNSPECIFIED WHETHER NEUROGENIC CLAUDICATION PRESENT: ICD-10-CM

## 2023-02-02 DIAGNOSIS — G43.001 MIGRAINE WITHOUT AURA AND WITH STATUS MIGRAINOSUS, NOT INTRACTABLE: Chronic | ICD-10-CM

## 2023-02-02 DIAGNOSIS — R09.89 RUNNY NOSE: ICD-10-CM

## 2023-02-02 DIAGNOSIS — J30.1 SEASONAL ALLERGIC RHINITIS DUE TO POLLEN: ICD-10-CM

## 2023-02-02 DIAGNOSIS — Z00.00 WELL ADULT EXAM: ICD-10-CM

## 2023-02-02 DIAGNOSIS — E55.9 VITAMIN D DEFICIENCY: ICD-10-CM

## 2023-02-02 PROBLEM — E66.3 OVERWEIGHT: Status: ACTIVE | Noted: 2023-02-02

## 2023-02-02 PROCEDURE — 99214 OFFICE O/P EST MOD 30 MIN: CPT | Mod: 95 | Performed by: INTERNAL MEDICINE

## 2023-02-02 ASSESSMENT — PATIENT HEALTH QUESTIONNAIRE - PHQ9: CLINICAL INTERPRETATION OF PHQ2 SCORE: 0

## 2023-02-02 NOTE — ASSESSMENT & PLAN NOTE
This is a chronic ongoing condition  Symptoms has been noted since her last several months  She denies fever chills cough shortness of breath or wheezing.  Has been using fluticasone nasal spray 1 spray daily with some improvement but symptoms persist.

## 2023-02-02 NOTE — ASSESSMENT & PLAN NOTE
Chronic ongoing condition.  Patient denies recent migraine flareup.  She denies fever chills change in vision change in speech or paresthesia.  Takes over-the-counter medication as needed.

## 2023-02-02 NOTE — PROGRESS NOTES
This visit was conducted via  [employer] Virtual Visit using secure and encrypted videoconferencing technology.   The patient was at home in the state of [place]  The patient's identity was confirmed and verbal consent was obtained for this virtual visit.  -------------------------------------------------------------------------------    Chief complaint:    Runny nose  Medication review  Spinal stenosis follow-up  Follow-up migraine  Obesity  Hyperlipidemia    HPI: This is a 37 y.o. pt.  Pt's medical history is notable for:      Runny nose  This is a chronic ongoing condition  Symptoms has been noted since her last several months  She denies fever chills cough shortness of breath or wheezing.  Has been using fluticasone nasal spray 1 spray daily with some improvement but symptoms persist.    Lumbar radiculopathy  Chronic ongoing condition.  Previous MRI in 2020 reviewed which showed patient with L5/S1 disc extrusion which contacts and deforms the ventral surface of the thecal sac as well as mild central canal narrowing..  Patient was seen by physiatrist and has had physical therapy with improvement.  Patient has been doing regular stretching exercises with seem to help.  Patient denies fever chills bowel bladder dysfunction.  She denies perineal paresthesia.  Patient denies motor weakness     Migraine  Chronic ongoing condition.  Patient denies recent migraine flareup.  She denies fever chills change in vision change in speech or paresthesia.  Takes over-the-counter medication as needed.    Obesity  Chronic condition.    Body mass index is 37.93 kg/m².   Brief discussion with the patient regarding diet, exercise, and lifestyle modification to help achieve and maintain healthy weight              Dyslipidemia  Chronic ongoing condition.  Previous lab test showed borderline triglycerides level.  Lab test result discussed with the patient.  Stressed importance of diet exercise and to maintain ideal weight.  We also  "recommend a blood test to be done for follow-up.            REVIEW OF SYSTEMS:   As per HPI above. All other systems reviewed and negative.          Allergies: Vicodin [hydrocodone-acetaminophen]    Current Outpatient Medications Ordered in Epic   Medication Sig Dispense Refill    multivitamin (THERAGRAN) Tab Take 1 Tablet by mouth every day.      fluticasone (FLONASE) 50 MCG/ACT nasal spray Administer 1 Spray into affected nostril(S) every day. 16 g 3    etonogestrel (NEXPLANON) 68 MG Implant implant        No current Epic-ordered facility-administered medications on file.       Past Medical, Social, and Family history reviewed and updated in EPIC    ------------------------------------------------------------------    There were no vitals filed for this visit.  Vitals:    02/02/23 0658   Weight: 107 kg (235 lb)   Height: 1.676 m (5' 6\")        PHYSICAL EXAM:   Psych:  A&O x 3, mood and affect appropriate  Constitutional: no distress  Skin: No apparent rashes  ENMT: Lips without lesions. Phonation normal.  Respiratory: Unlabored respiratory effort        Lab Results   Component Value Date/Time    HBA1C 4.9 08/25/2022 06:55 AM    HBA1C 4.9 08/18/2021 08:00 AM    HBA1C 5.5 08/27/2020 09:25 AM       Lab Results   Component Value Date/Time    WBC 8.0 08/25/2022 06:55 AM    HEMOGLOBIN 14.7 08/25/2022 06:55 AM    HEMATOCRIT 43.9 08/25/2022 06:55 AM    MCV 91.5 08/25/2022 06:55 AM    PLATELETCT 354 08/25/2022 06:55 AM         Lab Results   Component Value Date/Time    SODIUM 137 08/25/2022 06:55 AM    POTASSIUM 4.2 08/25/2022 06:55 AM    GLUCOSE 83 08/25/2022 06:55 AM    BUN 11 08/25/2022 06:55 AM    CREATININE 0.72 08/25/2022 06:55 AM    CREATININE 0.7 03/17/2009 03:30 PM       Lab Results   Component Value Date/Time    CHOLSTRLTOT 137 08/25/2022 06:55 AM    LDL 68 08/25/2022 06:55 AM    HDL 39 (A) 08/25/2022 06:55 AM    TRIGLYCERIDE 150 (H) 08/25/2022 06:55 AM       Lab Results   Component Value Date/Time    ALTSGPT 18 " 08/25/2022 06:55 AM           ---------------------------------------------------------------------    ASSESSMENT and PLAN:     1. Runny nose  2. Allergic rhinitis  Chronic condition.  Suboptimally controlled  Advised the patient to increase fluticasone 1 spray twice daily.  Recommend patient to consider taking Zyrtec 10 mg daily which can be purchased over-the-counter without prescription.  Recommend follow-up in person in approximately 4 weeks for reevaluation.    3. Lumbar radiculopathy  4. Spinal stenosis of lumbar region, unspecified whether neurogenic claudication present  Chronic stable condition.  The patient MRI result discussed with the patient  Patient has seen physiatrist previously.  She has completed physical therapy.  Advised the patient to continue with stretching exercises.  Recommend patient to try to lose weight.      5. Migraine without aura and with status migrainosus, not intractable  Chronic stable condition.    Patient may take over-the-counter Tylenol as needed.  Continue to monitor.    6. Obesity without serious comorbidity, unspecified classification, unspecified obesity type  Chronic condition.  Uncontrolled.  Stressed importance of diet and exercise.  Encouraged weight loss.  Today we offered to refer the patient to the medical weight loss program.  The patient declined.    7. Dyslipidemia  Chronic condition.  Patient with borderline elevation of triglyceride level.  Advised the patient diet and exercise.  Repeat blood test for follow-up.  Continue with low-fat low-cholesterol diet.      Lab tests ordered.  Advised the patient follow-up after lab test done        Please note that this dictation was created using voice recognition software. I have made every reasonable attempt to correct obvious errors, but I expect that there are errors of grammar and possibly content that I did not discover before finalizing the note.       Mingo Brock MD  Internal Medicine  [facility]  clinic

## 2023-02-02 NOTE — ASSESSMENT & PLAN NOTE
Chronic condition.    Body mass index is 37.93 kg/m².   Brief discussion with the patient regarding diet, exercise, and lifestyle modification to help achieve and maintain healthy weight

## 2023-02-02 NOTE — ASSESSMENT & PLAN NOTE
Chronic ongoing condition.  Previous MRI in 2020 reviewed which showed patient with L5/S1 disc extrusion which contacts and deforms the ventral surface of the thecal sac as well as mild central canal narrowing..  Patient was seen by physiatrist and has had physical therapy with improvement.  Patient has been doing regular stretching exercises with seem to help.  Patient denies fever chills bowel bladder dysfunction.  She denies perineal paresthesia.  Patient denies motor weakness

## 2023-02-02 NOTE — ASSESSMENT & PLAN NOTE
Chronic ongoing condition.  Previous lab test showed borderline triglycerides level.  Lab test result discussed with the patient.  Stressed importance of diet exercise and to maintain ideal weight.  We also recommend a blood test to be done for follow-up.

## 2023-11-20 ENCOUNTER — HOSPITAL ENCOUNTER (OUTPATIENT)
Dept: LAB | Facility: MEDICAL CENTER | Age: 38
End: 2023-11-20
Attending: OBSTETRICS & GYNECOLOGY
Payer: COMMERCIAL

## 2023-11-20 PROCEDURE — 88175 CYTOPATH C/V AUTO FLUID REDO: CPT

## 2023-11-22 LAB
COMMENT NL11729A: NORMAL
CYTOLOGIST CVX/VAG CYTO: NORMAL
CYTOLOGY CVX/VAG DOC CYTO: NORMAL
CYTOLOGY CVX/VAG DOC THIN PREP: NORMAL
NOTE NL11727A: NORMAL
OTHER STN SPEC: NORMAL
STAT OF ADQ CVX/VAG CYTO-IMP: NORMAL

## 2024-05-29 SDOH — ECONOMIC STABILITY: INCOME INSECURITY: IN THE LAST 12 MONTHS, WAS THERE A TIME WHEN YOU WERE NOT ABLE TO PAY THE MORTGAGE OR RENT ON TIME?: NO

## 2024-05-29 SDOH — ECONOMIC STABILITY: FOOD INSECURITY: WITHIN THE PAST 12 MONTHS, THE FOOD YOU BOUGHT JUST DIDN'T LAST AND YOU DIDN'T HAVE MONEY TO GET MORE.: NEVER TRUE

## 2024-05-29 SDOH — HEALTH STABILITY: PHYSICAL HEALTH: ON AVERAGE, HOW MANY MINUTES DO YOU ENGAGE IN EXERCISE AT THIS LEVEL?: 30 MIN

## 2024-05-29 SDOH — ECONOMIC STABILITY: FOOD INSECURITY: WITHIN THE PAST 12 MONTHS, YOU WORRIED THAT YOUR FOOD WOULD RUN OUT BEFORE YOU GOT MONEY TO BUY MORE.: NEVER TRUE

## 2024-05-29 SDOH — HEALTH STABILITY: PHYSICAL HEALTH: ON AVERAGE, HOW MANY DAYS PER WEEK DO YOU ENGAGE IN MODERATE TO STRENUOUS EXERCISE (LIKE A BRISK WALK)?: 6 DAYS

## 2024-05-29 SDOH — ECONOMIC STABILITY: HOUSING INSECURITY: IN THE LAST 12 MONTHS, HOW MANY PLACES HAVE YOU LIVED?: 1

## 2024-05-29 SDOH — HEALTH STABILITY: MENTAL HEALTH
STRESS IS WHEN SOMEONE FEELS TENSE, NERVOUS, ANXIOUS, OR CAN'T SLEEP AT NIGHT BECAUSE THEIR MIND IS TROUBLED. HOW STRESSED ARE YOU?: NOT AT ALL

## 2024-05-29 SDOH — ECONOMIC STABILITY: INCOME INSECURITY: HOW HARD IS IT FOR YOU TO PAY FOR THE VERY BASICS LIKE FOOD, HOUSING, MEDICAL CARE, AND HEATING?: NOT HARD AT ALL

## 2024-05-29 ASSESSMENT — SOCIAL DETERMINANTS OF HEALTH (SDOH)
IN A TYPICAL WEEK, HOW MANY TIMES DO YOU TALK ON THE PHONE WITH FAMILY, FRIENDS, OR NEIGHBORS?: MORE THAN THREE TIMES A WEEK
HOW OFTEN DO YOU ATTEND CHURCH OR RELIGIOUS SERVICES?: MORE THAN 4 TIMES PER YEAR
HOW MANY DRINKS CONTAINING ALCOHOL DO YOU HAVE ON A TYPICAL DAY WHEN YOU ARE DRINKING: PATIENT DOES NOT DRINK
HOW OFTEN DO YOU HAVE A DRINK CONTAINING ALCOHOL: NEVER
WITHIN THE PAST 12 MONTHS, YOU WORRIED THAT YOUR FOOD WOULD RUN OUT BEFORE YOU GOT THE MONEY TO BUY MORE: NEVER TRUE
HOW OFTEN DO YOU ATTENT MEETINGS OF THE CLUB OR ORGANIZATION YOU BELONG TO?: MORE THAN 4 TIMES PER YEAR
DO YOU BELONG TO ANY CLUBS OR ORGANIZATIONS SUCH AS CHURCH GROUPS UNIONS, FRATERNAL OR ATHLETIC GROUPS, OR SCHOOL GROUPS?: YES
HOW HARD IS IT FOR YOU TO PAY FOR THE VERY BASICS LIKE FOOD, HOUSING, MEDICAL CARE, AND HEATING?: NOT HARD AT ALL
HOW OFTEN DO YOU GET TOGETHER WITH FRIENDS OR RELATIVES?: ONCE A WEEK
ARE YOU MARRIED, WIDOWED, DIVORCED, SEPARATED, NEVER MARRIED, OR LIVING WITH A PARTNER?: NEVER MARRIED
HOW OFTEN DO YOU ATTEND CHURCH OR RELIGIOUS SERVICES?: MORE THAN 4 TIMES PER YEAR
HOW OFTEN DO YOU GET TOGETHER WITH FRIENDS OR RELATIVES?: ONCE A WEEK
DO YOU BELONG TO ANY CLUBS OR ORGANIZATIONS SUCH AS CHURCH GROUPS UNIONS, FRATERNAL OR ATHLETIC GROUPS, OR SCHOOL GROUPS?: YES
ARE YOU MARRIED, WIDOWED, DIVORCED, SEPARATED, NEVER MARRIED, OR LIVING WITH A PARTNER?: NEVER MARRIED
HOW OFTEN DO YOU HAVE SIX OR MORE DRINKS ON ONE OCCASION: NEVER
IN A TYPICAL WEEK, HOW MANY TIMES DO YOU TALK ON THE PHONE WITH FAMILY, FRIENDS, OR NEIGHBORS?: MORE THAN THREE TIMES A WEEK
HOW OFTEN DO YOU ATTENT MEETINGS OF THE CLUB OR ORGANIZATION YOU BELONG TO?: MORE THAN 4 TIMES PER YEAR

## 2024-05-29 ASSESSMENT — LIFESTYLE VARIABLES
AUDIT-C TOTAL SCORE: 0
HOW OFTEN DO YOU HAVE SIX OR MORE DRINKS ON ONE OCCASION: NEVER
SKIP TO QUESTIONS 9-10: 1
HOW MANY STANDARD DRINKS CONTAINING ALCOHOL DO YOU HAVE ON A TYPICAL DAY: PATIENT DOES NOT DRINK
HOW OFTEN DO YOU HAVE A DRINK CONTAINING ALCOHOL: NEVER

## 2024-05-30 ENCOUNTER — APPOINTMENT (OUTPATIENT)
Dept: MEDICAL GROUP | Facility: PHYSICIAN GROUP | Age: 39
End: 2024-05-30
Payer: COMMERCIAL

## 2024-05-30 ENCOUNTER — HOSPITAL ENCOUNTER (OUTPATIENT)
Dept: LAB | Facility: MEDICAL CENTER | Age: 39
End: 2024-05-30
Attending: INTERNAL MEDICINE
Payer: COMMERCIAL

## 2024-05-30 VITALS
WEIGHT: 252 LBS | BODY MASS INDEX: 40.5 KG/M2 | SYSTOLIC BLOOD PRESSURE: 118 MMHG | HEART RATE: 74 BPM | HEIGHT: 66 IN | DIASTOLIC BLOOD PRESSURE: 74 MMHG | OXYGEN SATURATION: 95 % | TEMPERATURE: 98.3 F

## 2024-05-30 DIAGNOSIS — Z11.59 NEED FOR HEPATITIS C SCREENING TEST: ICD-10-CM

## 2024-05-30 DIAGNOSIS — D75.1 ERYTHROCYTOSIS: ICD-10-CM

## 2024-05-30 DIAGNOSIS — Z00.00 ANNUAL PHYSICAL EXAM: ICD-10-CM

## 2024-05-30 DIAGNOSIS — Z11.4 SCREENING FOR HIV (HUMAN IMMUNODEFICIENCY VIRUS): ICD-10-CM

## 2024-05-30 DIAGNOSIS — Z87.898 HISTORY OF ALCOHOL USE: ICD-10-CM

## 2024-05-30 DIAGNOSIS — J30.1 SEASONAL ALLERGIC RHINITIS DUE TO POLLEN: Chronic | ICD-10-CM

## 2024-05-30 DIAGNOSIS — E78.5 DYSLIPIDEMIA: Chronic | ICD-10-CM

## 2024-05-30 DIAGNOSIS — E55.9 VITAMIN D DEFICIENCY: Chronic | ICD-10-CM

## 2024-05-30 DIAGNOSIS — E66.9 OBESITY WITHOUT SERIOUS COMORBIDITY, UNSPECIFIED CLASSIFICATION, UNSPECIFIED OBESITY TYPE: Chronic | ICD-10-CM

## 2024-05-30 PROBLEM — R80.9 PROTEINURIA: Status: RESOLVED | Noted: 2020-08-28 | Resolved: 2024-05-30

## 2024-05-30 PROBLEM — R20.0 NUMBNESS OF RIGHT FOOT: Status: RESOLVED | Noted: 2020-08-27 | Resolved: 2024-05-30

## 2024-05-30 PROBLEM — R09.89 RUNNY NOSE: Status: RESOLVED | Noted: 2023-02-02 | Resolved: 2024-05-30

## 2024-05-30 LAB
25(OH)D3 SERPL-MCNC: 79 NG/ML (ref 30–100)
ALT SERPL-CCNC: 24 U/L (ref 2–50)
ANION GAP SERPL CALC-SCNC: 12 MMOL/L (ref 7–16)
BASOPHILS # BLD AUTO: 0.7 % (ref 0–1.8)
BASOPHILS # BLD: 0.05 K/UL (ref 0–0.12)
BUN SERPL-MCNC: 13 MG/DL (ref 8–22)
CALCIUM SERPL-MCNC: 9.8 MG/DL (ref 8.5–10.5)
CHLORIDE SERPL-SCNC: 104 MMOL/L (ref 96–112)
CHOLEST SERPL-MCNC: 157 MG/DL (ref 100–199)
CO2 SERPL-SCNC: 22 MMOL/L (ref 20–33)
CREAT SERPL-MCNC: 0.83 MG/DL (ref 0.5–1.4)
CREAT UR-MCNC: 29.6 MG/DL
EOSINOPHIL # BLD AUTO: 0.32 K/UL (ref 0–0.51)
EOSINOPHIL NFR BLD: 4.4 % (ref 0–6.9)
ERYTHROCYTE [DISTWIDTH] IN BLOOD BY AUTOMATED COUNT: 40.9 FL (ref 35.9–50)
FASTING STATUS PATIENT QL REPORTED: NORMAL
GFR SERPLBLD CREATININE-BSD FMLA CKD-EPI: 93 ML/MIN/1.73 M 2
GLUCOSE SERPL-MCNC: 88 MG/DL (ref 65–99)
HCT VFR BLD AUTO: 50 % (ref 37–47)
HCV AB SER QL: NORMAL
HDLC SERPL-MCNC: 44 MG/DL
HGB BLD-MCNC: 16.8 G/DL (ref 12–16)
HIV 1+2 AB+HIV1 P24 AG SERPL QL IA: NORMAL
IMM GRANULOCYTES # BLD AUTO: 0.02 K/UL (ref 0–0.11)
IMM GRANULOCYTES NFR BLD AUTO: 0.3 % (ref 0–0.9)
LDLC SERPL CALC-MCNC: 89 MG/DL
LYMPHOCYTES # BLD AUTO: 2.18 K/UL (ref 1–4.8)
LYMPHOCYTES NFR BLD: 29.7 % (ref 22–41)
MCH RBC QN AUTO: 30.8 PG (ref 27–33)
MCHC RBC AUTO-ENTMCNC: 33.6 G/DL (ref 32.2–35.5)
MCV RBC AUTO: 91.6 FL (ref 81.4–97.8)
MICROALBUMIN UR-MCNC: <1.2 MG/DL
MICROALBUMIN/CREAT UR: NORMAL MG/G (ref 0–30)
MONOCYTES # BLD AUTO: 0.53 K/UL (ref 0–0.85)
MONOCYTES NFR BLD AUTO: 7.2 % (ref 0–13.4)
NEUTROPHILS # BLD AUTO: 4.23 K/UL (ref 1.82–7.42)
NEUTROPHILS NFR BLD: 57.7 % (ref 44–72)
NRBC # BLD AUTO: 0 K/UL
NRBC BLD-RTO: 0 /100 WBC (ref 0–0.2)
PLATELET # BLD AUTO: 344 K/UL (ref 164–446)
PMV BLD AUTO: 10.3 FL (ref 9–12.9)
POTASSIUM SERPL-SCNC: 4.6 MMOL/L (ref 3.6–5.5)
RBC # BLD AUTO: 5.46 M/UL (ref 4.2–5.4)
SODIUM SERPL-SCNC: 138 MMOL/L (ref 135–145)
TRIGL SERPL-MCNC: 122 MG/DL (ref 0–149)
TSH SERPL DL<=0.005 MIU/L-ACNC: 1.17 UIU/ML (ref 0.38–5.33)
WBC # BLD AUTO: 7.3 K/UL (ref 4.8–10.8)

## 2024-05-30 RX ORDER — CHOLECALCIFEROL (VITAMIN D3) 25 MCG
CAPSULE ORAL
COMMUNITY

## 2024-05-30 ASSESSMENT — PATIENT HEALTH QUESTIONNAIRE - PHQ9: CLINICAL INTERPRETATION OF PHQ2 SCORE: 0

## 2024-05-30 NOTE — ASSESSMENT & PLAN NOTE
Patient reported that she has stopped drinking since the last 5yrs.  Patient was congratulated.  Pt still goes to AA several days a week.

## 2024-05-30 NOTE — PROGRESS NOTES
PRIMARY CARE CLINIC VISIT    Chief complaint:    Pt is here for Annual Exam      History of Present Illness     Obesity  Chronic condition.  Discussed with the patient regarding diet, exercise, and lifestyle modification to help achieve and maintain healthy weight     Pt reported with diet and exercise she has lost approx 9 lbs since last 10 weeks.  Patient not interested in taking any medication    History of alcohol use  Patient reported that she has stopped drinking since the last 5yrs.  Patient was congratulated.  Pt still goes to  several days a week.    Dyslipidemia  Chronic condition.  The patient is presently on diet therapy.  Patient is due for lab test.    Vitamin D deficiency  This is a chronic condition.  The patient has been taking vitamin D 1000 unit daily.  Recommend lab test to be done for follow-up.  Patient currently asymptomatic.    Allergic rhinitis  Chronic condition.  Patient is using Flonase daily as needed.  Patient denies fever chills shortness of breath wheezing or significant cough.  Patient reported that her symptoms are fairly well-controlled.      Allergies: Vicodin [hydrocodone-acetaminophen]    Current Outpatient Medications Ordered in Epic   Medication Sig Dispense Refill    multivitamin (THERAGRAN) Tab Take 1 Tablet by mouth every day.      fluticasone (FLONASE) 50 MCG/ACT nasal spray Administer 1 Spray into affected nostril(S) every day. 16 g 3    etonogestrel (NEXPLANON) 68 MG Implant implant       Cholecalciferol (VITAMIN D-3) 25 MCG (1000 UT) Cap        No current Epic-ordered facility-administered medications on file.       Past Medical History:   Diagnosis Date    ASTHMA     COVID-19 virus infection 9/28/2021    Migraine        Past Surgical History:   Procedure Laterality Date    TONSILLECTOMY      TONSILLECTOMY         Family History   Problem Relation Age of Onset    Cancer Mother         uterine cancer    Hypertension Mother     Anxiety disorder Mother     Diabetes  "Father     Hypertension Father     Asthma Father     COPD Maternal Grandfather        Social History     Tobacco Use   Smoking Status Former    Current packs/day: 0.00    Types: Cigarettes    Quit date: 2007    Years since quittin.9   Smokeless Tobacco Never       Social History     Substance and Sexual Activity   Alcohol Use Not Currently    Comment: occ       Review of systems:  As per HPI above. All other systems reviewed and negative.      Past Medical, Social, and Family history reviewed and updated in EPIC     Objective     Lab Results   Component Value Date/Time    HBA1C 4.9 2022 06:55 AM    HBA1C 4.9 2021 08:00 AM    HBA1C 5.5 2020 09:25 AM       Lab Results   Component Value Date/Time    WBC 8.0 2022 06:55 AM    HEMOGLOBIN 14.7 2022 06:55 AM    HEMATOCRIT 43.9 2022 06:55 AM    MCV 91.5 2022 06:55 AM    PLATELETCT 354 2022 06:55 AM         Lab Results   Component Value Date/Time    SODIUM 137 2022 06:55 AM    POTASSIUM 4.2 2022 06:55 AM    GLUCOSE 83 2022 06:55 AM    BUN 11 2022 06:55 AM    CREATININE 0.72 2022 06:55 AM    CREATININE 0.7 2009 03:30 PM       Lab Results   Component Value Date/Time    CHOLSTRLTOT 137 2022 06:55 AM    LDL 68 2022 06:55 AM    HDL 39 (A) 2022 06:55 AM    TRIGLYCERIDE 150 (H) 2022 06:55 AM       Lab Results   Component Value Date/Time    ALTSGPT 18 2022 06:55 AM       -------------------------------------------------------------------------------------------    /74 (BP Location: Right arm, Patient Position: Sitting, BP Cuff Size: Adult)   Pulse 74   Temp 36.8 °C (98.3 °F) (Temporal)   Ht 1.676 m (5' 6\")   Wt 114 kg (252 lb)   SpO2 95%   Body mass index is 40.67 kg/m².    General: alert in no apparent distress.  Cardiovascular: regular rate and rhythm  Pulmonary: lungs : no wheezing   Gastrointestinal: BS present.   Cranial nerves II to XII grossly " intact  Sinus with minimal clear nasal drainage.  Oropharynx no exudate  Assessment and Plan     1. Annual physical exam  - HEMOGLOBIN A1C; Future  - Basic Metabolic Panel; Future  - CBC WITH DIFFERENTIAL; Future  - Lipid Profile; Future  - TSH; Future  - MICROALBUMIN CREAT RATIO URINE; Future  - VITAMIN D,25 HYDROXY (DEFICIENCY); Future  - ALANINE AMINO-TRANS; Future  Routine lab work ordered.  Advised the patient to provide follow-up after few days.    2. Dyslipidemia  Chronic condition.  Current status unclear.  Lab test ordered to check lipid panel.  Continue with low-fat low-cholesterol diet    3. Vitamin D deficiency  Chronic condition.  Current status unclear.  Blood test ordered to check vitamin D level.  Patient will continue taking vitamin D 1000 unit at this time.    4. Allergic rhinitis  Chronic stable.  Continue Flonase nasal spray daily.    5. History of alcohol use  As above the patient has stopped drinking since the last 5 years.  She will continue attending AA on regular basis.  No new problems reported.    6. Obesity without serious comorbidity, unspecified classification, unspecified obesity type  Chronic condition.  Recommend diet and lifestyle modification.  The patient will try to maintain ideal weight    7. Screening for HIV (human immunodeficiency virus)  - HIV AG/AB COMBO ASSAY SCREENING; Future    8. Need for hepatitis C screening test  - HEP C VIRUS ANTIBODY; Future                  ANTICIPATORY GUIDANCE  Patient Counseling:  -Cholesterol screening. Fasting lipid panel  -Diabetes screening. Fasting blood sugar  -Diet: advised lean meats, fruits, vegetables, whole grains  -Discussed Healthful lifestyle measures. Pt to avoid tobacco, ETOH, and drug use.  -Pt to continue with regular exercise/walking activities  -Advised sun protection, sunscreen use  -Injury prevention: discussed safety seat belts  -Discussed preventive immunizations  -Recommend patient to schedule a yearly eye examination  and dental exam     Patient has been seeing her gynecologist on a yearly basis for cervical cancer screening and wellness exam                Please note that this dictation was created using voice recognition software. I have made every reasonable attempt to correct obvious errors, but I expect that there are errors of grammar and possibly content that I did not discover before finalizing the note.    Mingo Brock MD  Internal Medicine  [facility]

## 2024-05-30 NOTE — ASSESSMENT & PLAN NOTE
This is a chronic condition.  The patient has been taking vitamin D 1000 unit daily.  Recommend lab test to be done for follow-up.  Patient currently asymptomatic.

## 2024-05-30 NOTE — ASSESSMENT & PLAN NOTE
Chronic condition.  Discussed with the patient regarding diet, exercise, and lifestyle modification to help achieve and maintain healthy weight     Pt reported with diet and exercise she has lost approx 9 lbs since last 10 weeks.  Patient not interested in taking any medication

## 2024-05-30 NOTE — ASSESSMENT & PLAN NOTE
Chronic condition.  Patient is using Flonase daily as needed.  Patient denies fever chills shortness of breath wheezing or significant cough.  Patient reported that her symptoms are fairly well-controlled.

## 2024-05-30 NOTE — ASSESSMENT & PLAN NOTE
Rehabilitation Services        OUTPATIENT PHYSICAL THERAPY FUNCTIONAL EVALUATION  PLAN OF TREATMENT FOR OUTPATIENT REHABILITATION  (COMPLETE FOR INITIAL CLAIMS ONLY)  Patient's Last Name, First Name, M.I.  YOB: 1931  Carmelo Monteroia  GABE     Provider's Name   France Almeida PT   Medical Record No.  [id number]     Start of Care Date:  09/30/21   Onset Date:      Type:     _X__PT   ____OT  ____SLP Medical Diagnosis:  Osteoporosis with Parkinson's Disease     PT Diagnosis:  Pt demo's signs and sx consistent with increased bone fracture risk with osteoporosis with endurance and balance levels below age/gender norms increasing pt's difficulty with gait and risk for falls. Visits from SOC:  1                              __________________________________________________________________________________  Plan of Treatment/Functional Goals:  balance training, bed mobility training, gait training, neuromuscular re-education, ROM, strengthening, stretching, transfer training           GOALS     Pt will be able to perform 10 sit to stands in 30 seconds for decrease risk for falls and improved balance in 12 weeks.  12/29/21       Pt will be able to increased her 2 minute walking distance by 50% for improved performance with weight bearing exercise programming for decreased risk for bone fracture in 12 weeks  12/29/21       Pt will be knowledgeable in weight bearing strengthening exercise programming with HEP and at senior center gym without increase in back, knee or foot pain for improved hip strength to perform functional mobility in 12 weeks.  12/29/21         Therapy Frequency:  other (see comments) (1x/every 2-4 weeks)   Predicted Duration of Therapy Intervention:  12 weeks    France Almeida PT, DPT, OCS, CLT                                    I CERTIFY THE NEED FOR THESE SERVICES FURNISHED UNDER        THIS PLAN OF  Chronic condition.  The patient is presently on diet therapy.  Patient is due for lab test.   TREATMENT AND WHILE UNDER MY CARE     (Physician co-signature of this document indicates review and certification of the therapy plan).                Certification Date From:  09/30/21   Certification Date To:  12/29/21    Referring Provider:  Dr. Maggie Arriaga    Initial Assessment  See Epic Evaluation- Start of Care Date: 09/30/21 09/30/21 1600   Quick Adds   Quick Adds Certification   Type of Visit Initial OP PT Evaluation   General Information   Start of Care Date 09/30/21   Referring Physician Dr. Maggie Arriaga   Orders Evaluate and Treat as Indicated   Order Date 07/13/21   Medical Diagnosis Osteoporosis with Parkinson's Disease   Precautions/Limitations other (see comments)  (bone risk fracture)   Surgical/Medical history reviewed Yes   Pertinent history of current problem (include personal factors and/or comorbidities that impact the POC) Pt reports she has osteoporosis and Parkinsons. Pt reports that she was diagnosed with Parkinsons in 2015 - initial sx of shaking and unbalance. Pt reports h/o trigeminal neuralgia L side but has had it on the R as well - started in 2013 but will take gabapentin to help control this pain. Pt reports h/o triple bypass 2010 with recent clearance for only annual follow up. TKA R 2012 with good rehab. Pt also had a R reverse total shoulder replacement. 2018 had CHF that has resolved and DMII stable without meds at this time. Pt has also had 2 frozen shoulders, broken leg, broken wrist 2018. Pt reports having DEXA scan 4 years ago and bone density was normal. Pt reports she had parathyroidism but at this time those glands are functioning normally and bone density changes may be related to kidney. Pt will see an endocrinologist next week for further evaluation with bone quality. Pt is also going to get an EMG and see a neurologist to discuss sx of B foot tingling/numbness with some pain as well. Pt reports her sx with Parkinson's have been pretty steady over the years  with using medication. Pt also has h/o chronic neck and back pain with multiple injections and 2 RFAs in neck and back - last one 2016 with good relief. Pt can still have some pain in her back with ADLs including doing dishes. Pain in back and feet can be 0-4-5/10.    General Information Comments Pt reports next month there is going to be some health and wellness physical activity start up maybe 2x/week.   Fall Risk Screen   Fall screen completed by PT   Have you fallen 2 or more times in the past year? No   Have you fallen and had an injury in the past year? No   Is patient a fall risk? Yes   Fall screen comments SEE eval below   Abuse Screen (yes response referral indicated)   Feels Unsafe at Home or Work/School no   Feels Threatened by Someone no   Does Anyone Try to Keep You From Having Contact with Others or Doing Things Outside Your Home? no   Physical Signs of Abuse Present no   Functional Scales   Functional Scales and Outcomes LEFS 40/80   Pain   Patient currently in pain Yes   Pain location back and feet   Pain rating 0-4/10   Posture   Posture Forward head position;Protracted shoulders;Kyphosis   Range of Motion (ROM)   ROM Comment WFL neck, back and B UE/LEs except R shoulder elevation ~125 degrees with reverse total shoulder replacement   Strength   Strength Comments B shoulder flexors 4/5 with min pain R, B biceps 5/5, R triceps 4/5 (ho nerve damage), L triceps 5/5,  WNL, R hip flexor 3/5 with pain, L hip flexor 3+/5 with significant shakiness, B quad, DF and EHL 5/5   Gait   Gait Comments 2 minute walk test 370 feet with 4WW and mild trendelenberg - RPE 2-3/10 (age/gender norms ~440 ft)   Balance   Balance Comments Sit to stand 30 seconds 5 reps with significant difficulty from standard height chair and increased knee pain - from WW seat bench pt demo' minimal difficulty (Age/gender norm 9-10 reps)   Planned Therapy Interventions   Planned Therapy Interventions balance training;bed mobility  training;gait training;neuromuscular re-education;ROM;strengthening;stretching;transfer training   Clinical Impression   Criteria for Skilled Therapeutic Interventions Met yes, treatment indicated   PT Diagnosis Pt demo's signs and sx consistent with increased bone fracture risk with osteoporosis with endurance and balance levels below age/gender norms increasing pt's difficulty with gait and risk for falls.   Clinical Presentation Stable/Uncomplicated   Clinical Decision Making (Complexity) Low complexity   Therapy Frequency other (see comments)  (1x/every 2-4 weeks)   Predicted Duration of Therapy Intervention (days/wks) 12 weeks   Risk & Benefits of therapy have been explained Yes   Patient, Family & other staff in agreement with plan of care Yes   GOALS   PT Eval Goals 1;2;3   Goal 1   Goal Description Pt will be able to perform 10 sit to stands in 30 seconds for decrease risk for falls and improved balance in 12 weeks.   Target Date 12/29/21   Goal 2   Goal Description Pt will be able to increased her 2 minute walking distance by 50% for improved performance with weight bearing exercise programming for decreased risk for bone fracture in 12 weeks   Target Date 12/29/21   Goal 3   Goal Description Pt will be knowledgeable in weight bearing strengthening exercise programming with HEP and at senior center gym without increase in back, knee or foot pain for improved hip strength to perform functional mobility in 12 weeks.   Target Date 12/29/21   Total Evaluation Time   PT Cliff Low Complexity Minutes (57083) 30   Therapy Certification   Certification date from 09/30/21   Certification date to 12/29/21   Medical Diagnosis Osteoporosis with Parkinson's Disease   Certification I certify the need for these services furnished under this plan of treatment and while under my care.  (Physician co-signature of this document indicates review and certification of the therapy plan).

## 2024-05-31 LAB
EST. AVERAGE GLUCOSE BLD GHB EST-MCNC: 100 MG/DL
HBA1C MFR BLD: 5.1 % (ref 4–5.6)

## 2024-06-03 ENCOUNTER — HOSPITAL ENCOUNTER (OUTPATIENT)
Dept: LAB | Facility: MEDICAL CENTER | Age: 39
End: 2024-06-03
Attending: INTERNAL MEDICINE
Payer: COMMERCIAL

## 2024-06-03 DIAGNOSIS — D75.1 ERYTHROCYTOSIS: ICD-10-CM

## 2024-06-03 LAB
FERRITIN SERPL-MCNC: 42.4 NG/ML (ref 10–291)
IRON SERPL-MCNC: 84 UG/DL (ref 40–170)

## 2024-06-03 PROCEDURE — 81219 CALR GENE COM VARIANTS: CPT

## 2024-06-03 PROCEDURE — 82668 ASSAY OF ERYTHROPOIETIN: CPT

## 2024-06-03 PROCEDURE — 81270 JAK2 GENE: CPT

## 2024-06-03 PROCEDURE — 36415 COLL VENOUS BLD VENIPUNCTURE: CPT

## 2024-06-03 PROCEDURE — 81256 HFE GENE: CPT

## 2024-06-03 PROCEDURE — 82728 ASSAY OF FERRITIN: CPT

## 2024-06-03 PROCEDURE — 83540 ASSAY OF IRON: CPT

## 2024-06-03 PROCEDURE — 81338 MPL GENE COMMON VARIANTS: CPT

## 2024-06-04 LAB — EPO SERPL-ACNC: 7 MU/ML (ref 4–27)

## 2024-06-08 DIAGNOSIS — D75.1 ERYTHROCYTOSIS: ICD-10-CM

## 2024-06-08 DIAGNOSIS — Z14.8 HEMOCHROMATOSIS CARRIER: Primary | Chronic | ICD-10-CM

## 2024-06-08 LAB
HFE GENE MUT ANL BLD/T: NORMAL
HFE P.C282Y BLD/T QL: NEGATIVE
HFE P.H63D BLD/T QL: NORMAL
HFE P.S65C BLD/T QL: NEGATIVE

## 2024-06-10 LAB
JAK2 P.V617F BLD/T QL: NOT DETECTED
SPECIMEN SOURCE: NORMAL

## 2024-06-13 ENCOUNTER — PATIENT MESSAGE (OUTPATIENT)
Dept: MEDICAL GROUP | Facility: PHYSICIAN GROUP | Age: 39
End: 2024-06-13
Payer: COMMERCIAL

## 2024-06-13 LAB
GENE XXX MUT ANL BLD/T: NOT DETECTED
MPL P.W515 BLD/T QL: NOT DETECTED
SPECIMEN SOURCE: NORMAL
SPECIMEN SOURCE: NORMAL

## 2024-08-13 ENCOUNTER — PATIENT MESSAGE (OUTPATIENT)
Dept: HEALTH INFORMATION MANAGEMENT | Facility: OTHER | Age: 39
End: 2024-08-13

## 2024-09-03 ENCOUNTER — TELEPHONE (OUTPATIENT)
Dept: HEALTH INFORMATION MANAGEMENT | Facility: OTHER | Age: 39
End: 2024-09-03

## 2024-09-19 ENCOUNTER — OFFICE VISIT (OUTPATIENT)
Dept: URGENT CARE | Facility: CLINIC | Age: 39
End: 2024-09-19
Payer: COMMERCIAL

## 2024-09-19 VITALS
DIASTOLIC BLOOD PRESSURE: 60 MMHG | OXYGEN SATURATION: 95 % | SYSTOLIC BLOOD PRESSURE: 104 MMHG | TEMPERATURE: 97.9 F | WEIGHT: 243.1 LBS | BODY MASS INDEX: 39.07 KG/M2 | RESPIRATION RATE: 16 BRPM | HEIGHT: 66 IN | HEART RATE: 80 BPM

## 2024-09-19 DIAGNOSIS — H61.23 HEARING LOSS DUE TO CERUMEN IMPACTION, BILATERAL: ICD-10-CM

## 2024-09-19 DIAGNOSIS — H66.001 NON-RECURRENT ACUTE SUPPURATIVE OTITIS MEDIA OF RIGHT EAR WITHOUT SPONTANEOUS RUPTURE OF TYMPANIC MEMBRANE: ICD-10-CM

## 2024-09-19 PROCEDURE — 3078F DIAST BP <80 MM HG: CPT | Performed by: PHYSICIAN ASSISTANT

## 2024-09-19 PROCEDURE — 3074F SYST BP LT 130 MM HG: CPT | Performed by: PHYSICIAN ASSISTANT

## 2024-09-19 PROCEDURE — 99214 OFFICE O/P EST MOD 30 MIN: CPT | Performed by: PHYSICIAN ASSISTANT

## 2024-09-19 ASSESSMENT — ENCOUNTER SYMPTOMS
CHILLS: 0
VOMITING: 0
FEVER: 0
NAUSEA: 0

## 2024-09-19 NOTE — PROGRESS NOTES
Subjective:   Anaya Cortes is a 38 y.o. female who presents for Otalgia (Pt has (R) ear discomfort, trouble hearing x 3 days )        Patient presents with concerns of bilateral ear fullness, right greater than left as well as some right ear discomfort for the last 3 days.  Symptoms have been gradually worsening.  She endorses some mild congestion but denies fevers, chills, cough.  She has history of seasonal allergies.  She has not been taking any medications for her symptoms.      Review of Systems   Constitutional:  Negative for chills and fever.   HENT:  Positive for congestion and ear pain.    Gastrointestinal:  Negative for nausea and vomiting.       PMH:  has a past medical history of ASTHMA, COVID-19 virus infection (9/28/2021), and Migraine.    She has no past medical history of CAD (coronary artery disease), Cancer (HCC), Congestive heart failure (HCC), COPD, Diabetes, Hypertension, Liver disease, Psychiatric disorder, Renal disorder, Seizure disorder (HCC), or Stroke (HCC).  MEDS:   Current Outpatient Medications:     Emollient (COLLAGEN EX), , Disp: , Rfl:     VITAMIN B COMPLEX-C PO, , Disp: , Rfl:     APPLE CIDER VINEGAR PO, , Disp: , Rfl:     amoxicillin-clavulanate (AUGMENTIN) 875-125 MG Tab, Take 1 Tablet by mouth 2 times a day for 7 days., Disp: 14 Tablet, Rfl: 0    Cholecalciferol (VITAMIN D-3) 25 MCG (1000 UT) Cap, , Disp: , Rfl:     multivitamin (THERAGRAN) Tab, Take 1 Tablet by mouth every day., Disp: , Rfl:     fluticasone (FLONASE) 50 MCG/ACT nasal spray, Administer 1 Spray into affected nostril(S) every day., Disp: 16 g, Rfl: 3    etonogestrel (NEXPLANON) 68 MG Implant implant, , Disp: , Rfl:   ALLERGIES:   Allergies   Allergen Reactions    Vicodin [Hydrocodone-Acetaminophen] Nausea     SURGHX:   Past Surgical History:   Procedure Laterality Date    TONSILLECTOMY      TONSILLECTOMY       SOCHX:  reports that she quit smoking about 17 years ago. Her smoking use included cigarettes.  "She has never used smokeless tobacco. She reports that she does not currently use alcohol. She reports that she does not use drugs.  FH: Family history was reviewed, no pertinent findings to report   Objective:   /60 (BP Location: Left arm, Patient Position: Sitting, BP Cuff Size: Large adult)   Pulse 80   Temp 36.6 °C (97.9 °F) (Temporal)   Resp 16   Ht 1.676 m (5' 6\")   Wt 110 kg (243 lb 1.6 oz)   SpO2 95%   BMI 39.24 kg/m²   Physical Exam  Vitals reviewed.   Constitutional:       General: She is not in acute distress.     Appearance: Normal appearance. She is well-developed. She is not toxic-appearing.   HENT:      Head: Normocephalic and atraumatic.      Right Ear: External ear normal. No mastoid tenderness.      Left Ear: External ear normal. No mastoid tenderness.      Ears:      Comments: Complete bilateral cerumen impactions.  Ears lavaged revealing intact TMs.  Right TM is erythematous, dull and bulging with a cloudy effusion.  Left TM is pearly white with positive light reflex-no effusion.     Nose: Nose normal.   Cardiovascular:      Rate and Rhythm: Normal rate and regular rhythm.   Pulmonary:      Effort: Pulmonary effort is normal. No respiratory distress.      Breath sounds: No stridor.   Skin:     General: Skin is dry.   Neurological:      Comments: Alert and oriented.    Psychiatric:         Speech: Speech normal.         Behavior: Behavior normal.           Assessment/Plan:   1. Non-recurrent acute suppurative otitis media of right ear without spontaneous rupture of tympanic membrane  - amoxicillin-clavulanate (AUGMENTIN) 875-125 MG Tab; Take 1 Tablet by mouth 2 times a day for 7 days.  Dispense: 14 Tablet; Refill: 0    2. Hearing loss due to cerumen impaction, bilateral    Other orders  - Emollient (COLLAGEN EX)  - VITAMIN B COMPLEX-C PO  - APPLE CIDER VINEGAR PO    Considerations include but not limited to cerumen impaction, AOM, otitis externa PASHA, eustachian tube dysfunction, TMJ, " VZV, trigeminal neuralgia.    Right AOM on exam today.  Patient started on oral antibiotics.  Recommend that she take with food and take probiotic concurrently.  If no improvement within 3 to 4 days, new symptoms develop at any point or symptoms worsen return to clinic or see PCP for reevaluation.

## 2024-11-19 ENCOUNTER — TELEPHONE (OUTPATIENT)
Dept: HEALTH INFORMATION MANAGEMENT | Facility: OTHER | Age: 39
End: 2024-11-19
Payer: COMMERCIAL

## 2024-11-25 ENCOUNTER — OFFICE VISIT (OUTPATIENT)
Dept: MEDICAL GROUP | Facility: PHYSICIAN GROUP | Age: 39
End: 2024-11-25
Payer: COMMERCIAL

## 2024-11-25 VITALS
OXYGEN SATURATION: 98 % | TEMPERATURE: 96.6 F | DIASTOLIC BLOOD PRESSURE: 74 MMHG | HEIGHT: 66 IN | HEART RATE: 96 BPM | SYSTOLIC BLOOD PRESSURE: 116 MMHG | BODY MASS INDEX: 38.11 KG/M2 | WEIGHT: 237.1 LBS

## 2024-11-25 DIAGNOSIS — E78.5 DYSLIPIDEMIA: Chronic | ICD-10-CM

## 2024-11-25 DIAGNOSIS — Z14.8 HEMOCHROMATOSIS CARRIER: Chronic | ICD-10-CM

## 2024-11-25 DIAGNOSIS — E55.9 VITAMIN D DEFICIENCY: Chronic | ICD-10-CM

## 2024-11-25 DIAGNOSIS — G43.001 MIGRAINE WITHOUT AURA AND WITH STATUS MIGRAINOSUS, NOT INTRACTABLE: ICD-10-CM

## 2024-11-25 DIAGNOSIS — E66.9 OBESITY WITHOUT SERIOUS COMORBIDITY, UNSPECIFIED CLASS, UNSPECIFIED OBESITY TYPE: Chronic | ICD-10-CM

## 2024-11-25 DIAGNOSIS — M25.472 ANKLE SWELLING, LEFT: ICD-10-CM

## 2024-11-25 DIAGNOSIS — D75.1 ERYTHROCYTOSIS: ICD-10-CM

## 2024-11-25 DIAGNOSIS — J30.1 SEASONAL ALLERGIC RHINITIS DUE TO POLLEN: Chronic | ICD-10-CM

## 2024-11-25 PROCEDURE — 3078F DIAST BP <80 MM HG: CPT | Performed by: INTERNAL MEDICINE

## 2024-11-25 PROCEDURE — 3074F SYST BP LT 130 MM HG: CPT | Performed by: INTERNAL MEDICINE

## 2024-11-25 PROCEDURE — 99214 OFFICE O/P EST MOD 30 MIN: CPT | Performed by: INTERNAL MEDICINE

## 2024-11-25 RX ORDER — FUROSEMIDE 20 MG/1
20 TABLET ORAL
Qty: 30 TABLET | Refills: 3 | Status: SHIPPED | OUTPATIENT
Start: 2024-11-25

## 2024-11-25 NOTE — ASSESSMENT & PLAN NOTE
Chronic condition.  The patient is using Flonase nasal spray as needed.  Patient denies fever chills shortness of breath or wheezing

## 2024-11-25 NOTE — ASSESSMENT & PLAN NOTE
This is a chronic condition.  The patient has been followed by hematology service.  Review of test showed mildly elevated hemoglobin and hematocrit  Patient has pending appointment to follow-up with hematologist in March 2025 to repeat the lab test

## 2024-11-25 NOTE — ASSESSMENT & PLAN NOTE
Chronic condition.  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  The patient followed by hematology and sleep specialist.  Currently not on therapy.  As above the patient has pending appointment to repeat the lab test CBC in March 2025 with the hematologist.

## 2024-11-25 NOTE — PROGRESS NOTES
PRIMARY CARE CLINIC VISIT        Chief Complaint   Patient presents with    Ankle Injury     Left ankle swelling for a week, no pain.      Left ankle swelling  Erythrocytosis  Hemochromatosis carrier  Overweight  Hyperlipidemia  Vitamin D deficiency  Allergic rhinitis  Migraine          History of Present Illness     Hemochromatosis carrier  This is a chronic condition.  The patient has been followed by hematology service.  Review of test showed mildly elevated hemoglobin and hematocrit  Patient has pending appointment to follow-up with hematologist in March 2025 to repeat the lab test    Erythrocytosis  Chronic condition.  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  The patient followed by hematology and sleep specialist.  Currently not on therapy.  As above the patient has pending appointment to repeat the lab test CBC in March 2025 with the hematologist.    Obesity  Condition.  Patient reported that with diet and exercise patient has lost over 20 pounds.  Patient was congratulated.    Allergic rhinitis  Chronic condition.  The patient is using Flonase nasal spray as needed.  Patient denies fever chills shortness of breath or wheezing    Dyslipidemia  Chronic condition.  Patient currently on diet therapy.  Previous lab test result discussed with the patient.    Vitamin D deficiency  Chronic condition.  The patient taking vitamin D supplementation.  No new symptoms reported.    Migraine  Chronic condition.  Patient takes over-the-counter medication as needed.  Currently the patient asymptomatic.    Ankle swelling, left  This is a new condition.  The patient reported recurrent left lower extremity swelling since the last 1 week.  Patient denies recent trauma or injury she denies pain or discomfort.  Patient reported that mother with edema.    Current Outpatient Medications on File Prior to Visit   Medication Sig Dispense Refill    Emollient (COLLAGEN EX)       VITAMIN B COMPLEX-C PO        APPLE CIDER VINEGAR PO       Cholecalciferol (VITAMIN D-3) 25 MCG (1000 UT) Cap       multivitamin (THERAGRAN) Tab Take 1 Tablet by mouth every day.      fluticasone (FLONASE) 50 MCG/ACT nasal spray Administer 1 Spray into affected nostril(S) every day. 16 g 3    etonogestrel (NEXPLANON) 68 MG Implant implant        No current facility-administered medications on file prior to visit.        Allergies: Vicodin [hydrocodone-acetaminophen]    Current Outpatient Medications Ordered in Epic   Medication Sig Dispense Refill    furosemide (LASIX) 20 MG Tab Take 1 Tablet by mouth 1 time a day as needed (leg swelling). 30 Tablet 3    Emollient (COLLAGEN EX)       VITAMIN B COMPLEX-C PO       APPLE CIDER VINEGAR PO       Cholecalciferol (VITAMIN D-3) 25 MCG (1000 UT) Cap       multivitamin (THERAGRAN) Tab Take 1 Tablet by mouth every day.      fluticasone (FLONASE) 50 MCG/ACT nasal spray Administer 1 Spray into affected nostril(S) every day. 16 g 3    etonogestrel (NEXPLANON) 68 MG Implant implant        No current Epic-ordered facility-administered medications on file.       Past Medical History:   Diagnosis Date    ASTHMA     COVID-19 virus infection 2021    Migraine        Past Surgical History:   Procedure Laterality Date    TONSILLECTOMY      TONSILLECTOMY         Family History   Problem Relation Age of Onset    Cancer Mother         uterine cancer    Hypertension Mother     Anxiety disorder Mother     Diabetes Father     Hypertension Father     Asthma Father     COPD Maternal Grandfather        Social History     Tobacco Use   Smoking Status Former    Current packs/day: 0.00    Types: Cigarettes    Quit date: 2007    Years since quittin.4   Smokeless Tobacco Never       Social History     Substance and Sexual Activity   Alcohol Use Not Currently    Comment: occ       Review of systems  As per HPI above. All other systems reviewed and negative.      Past Medical, Social, and Family history reviewed  "and updated in EPIC       LAB DATA:     I have independently reviewed / interpreted labs    Lab Results   Component Value Date/Time    HBA1C 5.1 05/30/2024 08:01 AM    HBA1C 4.9 08/25/2022 06:55 AM    HBA1C 4.9 08/18/2021 08:00 AM        Lab Results   Component Value Date/Time    WBC 7.3 05/30/2024 08:01 AM    HEMOGLOBIN 16.8 (H) 05/30/2024 08:01 AM    HEMATOCRIT 50.0 (H) 05/30/2024 08:01 AM    MCV 91.6 05/30/2024 08:01 AM    PLATELETCT 344 05/30/2024 08:01 AM       Lab Results   Component Value Date/Time    SODIUM 138 05/30/2024 08:01 AM    POTASSIUM 4.6 05/30/2024 08:01 AM    GLUCOSE 88 05/30/2024 08:01 AM    BUN 13 05/30/2024 08:01 AM    CREATININE 0.83 05/30/2024 08:01 AM    CREATININE 0.7 03/17/2009 03:30 PM       Lab Results   Component Value Date/Time    CHOLSTRLTOT 157 05/30/2024 08:01 AM    TRIGLYCERIDE 122 05/30/2024 08:01 AM    HDL 44 05/30/2024 08:01 AM    LDL 89 05/30/2024 08:01 AM       Lab Results   Component Value Date/Time    ALTSGPT 24 05/30/2024 08:01 AM          Objective     /74 (BP Location: Right arm, Patient Position: Sitting, BP Cuff Size: Adult)   Pulse 96   Temp 35.9 °C (96.6 °F) (Temporal)   Ht 1.676 m (5' 6\")   Wt 108 kg (237 lb 1.6 oz)   SpO2 98%    Body mass index is 38.27 kg/m².    General: alert in no apparent distress.  Cardiovascular: regular rate and rhythm  Pulmonary: lungs : no wheezing   Gastrointestinal: BS present.   Left lower extremity mild swelling noted from the ankle to the mid calf region.  Negative Homans' sign.  No palpable cord.  There is no redness or fluctuance noted.    Assessment and Plan     1. Ankle swelling, left  This is an acute condition.  Likely due to venous insufficiency.  Low suspicion for DVT  - furosemide (LASIX) 20 MG Tab; Take 1 Tablet by mouth 1 time a day as needed (leg swelling).  Dispense: 30 Tablet; Refill: 3  - US-EXTREMITY VENOUS LOWER UNILAT LEFT; Future  Recommend the use of compression stocking.  Leg elevation when possible.  " The patient may take Lasix as needed for swelling.  Recommend follow-up after ultrasound done    2. Erythrocytosis  Chronic condition.  Patient with hemochromatosis carrier and sleep apnea.  Both symptoms are mild per patient report.  Patient will follow-up with sleep specialist and hematologist.  Patient will repeat CBC in March 2025    3. Hemochromatosis carrier  Chronic condition.  Mild and stable.  Patient to follow-up with hematologist as directed in March 2025    4. Obesity without serious comorbidity, unspecified class, unspecified obesity type  Chronic condition.  Improved status.  With diet and exercise the patient has lost over 20 pounds.  Patient will continue with current plan    5. Dyslipidemia  Chronic condition.  Stable.  Recommend diet and lifestyle modification.  Recommend to repeat the panel once a year      6. Vitamin D deficiency  Condition.  Stable.  Continue vitamin D3 1 unit daily    7. Allergic rhinitis  Chronic stable.  Continue Flonase nasal spray daily PN.    8. Migraine without aura and with status migrainosus, not intractable    Chronic stable.  Patient may take over-the-counter NSAID as needed.  Currently the patient asymptomatic                  Please note that this dictation was created using voice recognition software. I have made every reasonable attempt to correct obvious errors, but I expect that there are errors of grammar and possibly content that I did not discover before finalizing the note.    Mingo Brock MD  Internal Medicine  [facility]

## 2024-11-25 NOTE — ASSESSMENT & PLAN NOTE
Condition.  Patient reported that with diet and exercise patient has lost over 20 pounds.  Patient was congratulated.

## 2024-11-25 NOTE — ASSESSMENT & PLAN NOTE
Chronic condition.  Patient currently on diet therapy.  Previous lab test result discussed with the patient.

## 2024-11-25 NOTE — ASSESSMENT & PLAN NOTE
Chronic condition.  Patient takes over-the-counter medication as needed.  Currently the patient asymptomatic.

## 2024-11-25 NOTE — ASSESSMENT & PLAN NOTE
This is a new condition.  The patient reported recurrent left lower extremity swelling since the last 1 week.  Patient denies recent trauma or injury she denies pain or discomfort.  Patient reported that mother with edema.

## 2024-11-26 ENCOUNTER — HOSPITAL ENCOUNTER (OUTPATIENT)
Facility: MEDICAL CENTER | Age: 39
End: 2024-11-26
Attending: OBSTETRICS & GYNECOLOGY
Payer: COMMERCIAL

## 2024-11-26 PROCEDURE — 88142 CYTOPATH C/V THIN LAYER: CPT

## 2024-12-04 LAB
HPV I/H RISK 1 DNA SPEC QL NAA+PROBE: DETECTED
SPECIMEN SOURCE: ABNORMAL
THINPREP PAP, CYTOLOGY NL11781: NORMAL

## 2024-12-05 LAB
HPV16 DNA CVX QL PROBE+SIG AMP: NOT DETECTED
HPV18 DNA CVX QL PROBE+SIG AMP: NOT DETECTED
SPECIMEN SOURCE: NORMAL

## 2025-01-29 ENCOUNTER — APPOINTMENT (OUTPATIENT)
Dept: MEDICAL GROUP | Facility: PHYSICIAN GROUP | Age: 40
End: 2025-01-29
Payer: COMMERCIAL

## 2025-03-12 ENCOUNTER — OFFICE VISIT (OUTPATIENT)
Dept: URGENT CARE | Facility: CLINIC | Age: 40
End: 2025-03-12
Payer: COMMERCIAL

## 2025-03-12 VITALS
HEART RATE: 71 BPM | OXYGEN SATURATION: 95 % | DIASTOLIC BLOOD PRESSURE: 82 MMHG | TEMPERATURE: 97.5 F | BODY MASS INDEX: 37.45 KG/M2 | WEIGHT: 233 LBS | SYSTOLIC BLOOD PRESSURE: 114 MMHG | HEIGHT: 66 IN | RESPIRATION RATE: 17 BRPM

## 2025-03-12 DIAGNOSIS — H66.91 RIGHT ACUTE OTITIS MEDIA: ICD-10-CM

## 2025-03-12 PROCEDURE — 3074F SYST BP LT 130 MM HG: CPT

## 2025-03-12 PROCEDURE — 99213 OFFICE O/P EST LOW 20 MIN: CPT

## 2025-03-12 PROCEDURE — 3079F DIAST BP 80-89 MM HG: CPT

## 2025-03-13 NOTE — PROGRESS NOTES
Verbal consent was acquired by the patient to use [employer] ambient listening note generation during this visit   Subjective:   Anaya Cortes is a 39 y.o. female who presents for Ear Fullness (Headache off and on X2 wks/)      HPI:  History of Present Illness  The patient is a 39-year-old female who presents for evaluation of an ear infection.    She reports a sensation of pressure in her ears, describing it as feeling like there is something present. She experiences intermittent pain but does not have any associated fever. Approximately 2 to 3 weeks ago, she experienced severe allergies, including nasal congestion, which have since resolved; however, the ear symptoms persist. She has no known drug allergies. She has been using Flonase daily due to her history of severe allergies. She has a past medical history of otitis media.       Review of Systems   HENT:  Negative for ear discharge.         Ear fullness       Medications:    Current Outpatient Medications on File Prior to Visit   Medication Sig Dispense Refill    furosemide (LASIX) 20 MG Tab Take 1 Tablet by mouth 1 time a day as needed (leg swelling). 30 Tablet 3    Emollient (COLLAGEN EX)       VITAMIN B COMPLEX-C PO       APPLE CIDER VINEGAR PO       Cholecalciferol (VITAMIN D-3) 25 MCG (1000 UT) Cap       multivitamin (THERAGRAN) Tab Take 1 Tablet by mouth every day.      fluticasone (FLONASE) 50 MCG/ACT nasal spray Administer 1 Spray into affected nostril(S) every day. 16 g 3    etonogestrel (NEXPLANON) 68 MG Implant implant        No current facility-administered medications on file prior to visit.        Allergies:   Vicodin [hydrocodone-acetaminophen]    Problem List:   Patient Active Problem List   Diagnosis    Migraine    Lumbar radiculopathy    Obesity    Leukocytosis    Allergic rhinitis    Lumbar spinal stenosis    Dyslipidemia    Vitamin D deficiency    Annual physical exam    History of alcohol use    Erythrocytosis    Hemochromatosis  "carrier    Ankle swelling, left        Surgical History:  Past Surgical History:   Procedure Laterality Date    TONSILLECTOMY      TONSILLECTOMY         Past Social Hx:   Social History     Tobacco Use    Smoking status: Former     Current packs/day: 0.00     Types: Cigarettes     Quit date: 2007     Years since quittin.7    Smokeless tobacco: Never   Vaping Use    Vaping status: Never Used   Substance Use Topics    Alcohol use: Not Currently    Drug use: No          Problem list, medications, and allergies reviewed by myself today in Epic.     Objective:     /82   Pulse 71   Temp 36.4 °C (97.5 °F) (Temporal)   Resp 17   Ht 1.676 m (5' 6\")   Wt 106 kg (233 lb)   SpO2 95%   BMI 37.61 kg/m²     Physical Exam  Vitals and nursing note reviewed.   Constitutional:       General: She is not in acute distress.     Appearance: Normal appearance. She is normal weight. She is not ill-appearing, toxic-appearing or diaphoretic.   HENT:      Head: Normocephalic and atraumatic.      Right Ear: A middle ear effusion is present. Tympanic membrane is erythematous.      Left Ear: Tympanic membrane, ear canal and external ear normal. There is no impacted cerumen.      Nose: No congestion or rhinorrhea.      Mouth/Throat:      Mouth: Mucous membranes are moist.      Pharynx: Oropharynx is clear. No oropharyngeal exudate or posterior oropharyngeal erythema.   Cardiovascular:      Rate and Rhythm: Normal rate and regular rhythm.      Pulses: Normal pulses.      Heart sounds: Normal heart sounds. No murmur heard.     No friction rub. No gallop.   Pulmonary:      Effort: Pulmonary effort is normal. No respiratory distress.      Breath sounds: Normal breath sounds. No stridor. No wheezing, rhonchi or rales.   Chest:      Chest wall: No tenderness.   Musculoskeletal:      Cervical back: Neck supple. No tenderness.   Lymphadenopathy:      Cervical: No cervical adenopathy.   Skin:     General: Skin is warm and dry.      " Capillary Refill: Capillary refill takes less than 2 seconds.   Neurological:      General: No focal deficit present.      Mental Status: She is alert and oriented to person, place, and time. Mental status is at baseline.      Cranial Nerves: No cranial nerve deficit.      Motor: No weakness.      Gait: Gait normal.   Psychiatric:         Mood and Affect: Mood normal.         Behavior: Behavior normal.         Thought Content: Thought content normal.         Judgment: Judgment normal.         Assessment/Plan:     Diagnosis and associated orders:   1. Right acute otitis media  - amoxicillin-clavulanate (AUGMENTIN) 875-125 MG Tab; Take 1 Tablet by mouth 2 times a day for 7 days.  Dispense: 14 Tablet; Refill: 0        Comments/MDM:   Pt is clinically stable at today's acute urgent care visit.  No acute distress noted. Appropriate for outpatient management at this time.     Assessment & Plan  1. Otitis media.   A course of antibiotics will be initiated to address the potential infection. She is advised to continue using Flonase to help with the fluid sensation. If symptoms do not improve, she should return for further evaluation.            Discussed DDx, management options (risks,benefits, and alternatives to planned treatment), natural progression and supportive care.  Expressed understanding and the treatment plan was agreed upon. Questions were encouraged and answered   Return to urgent care prn if new or worsening sx or if there is no improvement in condition prn.    Educated in Red flags and indications to immediately call 911 or present to the Emergency Department.   Advised the patient to follow-up with the primary care physician for recheck, reevaluation, and consideration of further management.    I personally reviewed prior external notes and test results pertinent to today's visit.  I have independently reviewed and interpreted all diagnostics ordered during this urgent care acute visit.       Please note  that this dictation was created using voice recognition software. I have made a reasonable attempt to correct obvious errors, but I expect that there are errors of grammar and possibly content that I did not discover before finalizing the note.    This note was electronically signed by DA Aquino